# Patient Record
Sex: MALE | Race: WHITE | HISPANIC OR LATINO | URBAN - METROPOLITAN AREA
[De-identification: names, ages, dates, MRNs, and addresses within clinical notes are randomized per-mention and may not be internally consistent; named-entity substitution may affect disease eponyms.]

---

## 2017-04-14 ENCOUNTER — OUTPATIENT (OUTPATIENT)
Dept: OUTPATIENT SERVICES | Facility: HOSPITAL | Age: 57
LOS: 1 days | End: 2017-04-14
Payer: COMMERCIAL

## 2017-04-14 DIAGNOSIS — A42.0: ICD-10-CM

## 2017-04-14 PROCEDURE — 77001 FLUOROGUIDE FOR VEIN DEVICE: CPT

## 2017-04-14 PROCEDURE — 36558 INSERT TUNNELED CV CATH: CPT

## 2017-04-14 PROCEDURE — 76937 US GUIDE VASCULAR ACCESS: CPT | Mod: 26

## 2017-04-14 PROCEDURE — 77001 FLUOROGUIDE FOR VEIN DEVICE: CPT | Mod: 26

## 2017-04-14 PROCEDURE — C1751: CPT

## 2017-04-14 PROCEDURE — 76937 US GUIDE VASCULAR ACCESS: CPT

## 2017-06-02 ENCOUNTER — OUTPATIENT (OUTPATIENT)
Dept: OUTPATIENT SERVICES | Facility: HOSPITAL | Age: 57
LOS: 1 days | End: 2017-06-02
Payer: COMMERCIAL

## 2017-06-02 DIAGNOSIS — A42.0: ICD-10-CM

## 2017-06-02 PROCEDURE — 36589 REMOVAL TUNNELED CV CATH: CPT

## 2017-06-02 PROCEDURE — 77001 FLUOROGUIDE FOR VEIN DEVICE: CPT | Mod: 26

## 2017-06-02 PROCEDURE — 76000 FLUOROSCOPY <1 HR PHYS/QHP: CPT

## 2023-06-15 ENCOUNTER — INPATIENT (INPATIENT)
Facility: HOSPITAL | Age: 63
LOS: 2 days | Discharge: HOME | DRG: 808 | End: 2023-06-18
Attending: STUDENT IN AN ORGANIZED HEALTH CARE EDUCATION/TRAINING PROGRAM | Admitting: INTERNAL MEDICINE
Payer: COMMERCIAL

## 2023-06-15 VITALS
HEIGHT: 69 IN | SYSTOLIC BLOOD PRESSURE: 105 MMHG | OXYGEN SATURATION: 100 % | DIASTOLIC BLOOD PRESSURE: 62 MMHG | WEIGHT: 160.06 LBS | TEMPERATURE: 98 F | HEART RATE: 96 BPM | RESPIRATION RATE: 18 BRPM

## 2023-06-15 DIAGNOSIS — D61.818 OTHER PANCYTOPENIA: ICD-10-CM

## 2023-06-15 LAB
ABO RH CONFIRMATION: SIGNIFICANT CHANGE UP
ALBUMIN SERPL ELPH-MCNC: 2 G/DL — LOW (ref 3.3–5)
ALP SERPL-CCNC: 291 U/L — HIGH (ref 40–120)
ALT FLD-CCNC: 217 U/L — HIGH (ref 12–78)
ANION GAP SERPL CALC-SCNC: 10 MMOL/L — SIGNIFICANT CHANGE UP (ref 5–17)
ANISOCYTOSIS BLD QL: SLIGHT — SIGNIFICANT CHANGE UP
APPEARANCE UR: CLEAR — SIGNIFICANT CHANGE UP
APTT BLD: 29.7 SEC — SIGNIFICANT CHANGE UP (ref 27.5–35.5)
AST SERPL-CCNC: 52 U/L — HIGH (ref 15–37)
BASOPHILS # BLD AUTO: 0 K/UL — SIGNIFICANT CHANGE UP (ref 0–0.2)
BASOPHILS NFR BLD AUTO: 0 % — SIGNIFICANT CHANGE UP (ref 0–2)
BILIRUB SERPL-MCNC: 1.2 MG/DL — SIGNIFICANT CHANGE UP (ref 0.2–1.2)
BILIRUB UR-MCNC: NEGATIVE — SIGNIFICANT CHANGE UP
BLD GP AB SCN SERPL QL: SIGNIFICANT CHANGE UP
BUN SERPL-MCNC: 30 MG/DL — HIGH (ref 7–23)
CALCIUM SERPL-MCNC: 8.4 MG/DL — LOW (ref 8.5–10.1)
CHLORIDE SERPL-SCNC: 107 MMOL/L — SIGNIFICANT CHANGE UP (ref 96–108)
CO2 SERPL-SCNC: 18 MMOL/L — LOW (ref 22–31)
COLOR SPEC: YELLOW — SIGNIFICANT CHANGE UP
CREAT SERPL-MCNC: 1.61 MG/DL — HIGH (ref 0.5–1.3)
DIFF PNL FLD: NEGATIVE — SIGNIFICANT CHANGE UP
EGFR: 47 ML/MIN/1.73M2 — LOW
EOSINOPHIL # BLD AUTO: 0.02 K/UL — SIGNIFICANT CHANGE UP (ref 0–0.5)
EOSINOPHIL NFR BLD AUTO: 1 % — SIGNIFICANT CHANGE UP (ref 0–6)
GLUCOSE SERPL-MCNC: 124 MG/DL — HIGH (ref 70–99)
GLUCOSE UR QL: NEGATIVE — SIGNIFICANT CHANGE UP
HCT VFR BLD CALC: 17.1 % — CRITICAL LOW (ref 39–50)
HGB BLD-MCNC: 5.5 G/DL — CRITICAL LOW (ref 13–17)
INR BLD: 1.36 RATIO — HIGH (ref 0.88–1.16)
KETONES UR-MCNC: NEGATIVE — SIGNIFICANT CHANGE UP
LACTATE SERPL-SCNC: 1.8 MMOL/L — SIGNIFICANT CHANGE UP (ref 0.7–2)
LACTATE SERPL-SCNC: 3.5 MMOL/L — HIGH (ref 0.7–2)
LEUKOCYTE ESTERASE UR-ACNC: NEGATIVE — SIGNIFICANT CHANGE UP
LYMPHOCYTES # BLD AUTO: 0.28 K/UL — LOW (ref 1–3.3)
LYMPHOCYTES # BLD AUTO: 15 % — SIGNIFICANT CHANGE UP (ref 13–44)
MANUAL SMEAR VERIFICATION: SIGNIFICANT CHANGE UP
MCHC RBC-ENTMCNC: 29.4 PG — SIGNIFICANT CHANGE UP (ref 27–34)
MCHC RBC-ENTMCNC: 32.2 GM/DL — SIGNIFICANT CHANGE UP (ref 32–36)
MCV RBC AUTO: 91.4 FL — SIGNIFICANT CHANGE UP (ref 80–100)
MONOCYTES # BLD AUTO: 0.04 K/UL — SIGNIFICANT CHANGE UP (ref 0–0.9)
MONOCYTES NFR BLD AUTO: 2 % — SIGNIFICANT CHANGE UP (ref 2–14)
NEUTROPHILS # BLD AUTO: 1.53 K/UL — LOW (ref 1.8–7.4)
NEUTROPHILS NFR BLD AUTO: 79 % — HIGH (ref 43–77)
NEUTS BAND # BLD: 3 % — SIGNIFICANT CHANGE UP (ref 0–8)
NITRITE UR-MCNC: NEGATIVE — SIGNIFICANT CHANGE UP
NRBC # BLD: 0 /100 — SIGNIFICANT CHANGE UP (ref 0–0)
NRBC # BLD: SIGNIFICANT CHANGE UP /100 WBCS (ref 0–0)
PH UR: 5 — SIGNIFICANT CHANGE UP (ref 5–8)
PLAT MORPH BLD: NORMAL — SIGNIFICANT CHANGE UP
PLATELET # BLD AUTO: 57 K/UL — LOW (ref 150–400)
PLATELET COUNT - ESTIMATE: ABNORMAL
POTASSIUM SERPL-MCNC: 4.5 MMOL/L — SIGNIFICANT CHANGE UP (ref 3.5–5.3)
POTASSIUM SERPL-SCNC: 4.5 MMOL/L — SIGNIFICANT CHANGE UP (ref 3.5–5.3)
PROT SERPL-MCNC: 6.8 GM/DL — SIGNIFICANT CHANGE UP (ref 6–8.3)
PROT UR-MCNC: NEGATIVE — SIGNIFICANT CHANGE UP
PROTHROM AB SERPL-ACNC: 15.8 SEC — HIGH (ref 10.5–13.4)
RBC # BLD: 1.87 M/UL — LOW (ref 4.2–5.8)
RBC # FLD: 15.2 % — HIGH (ref 10.3–14.5)
RBC BLD AUTO: ABNORMAL
SODIUM SERPL-SCNC: 135 MMOL/L — SIGNIFICANT CHANGE UP (ref 135–145)
SP GR SPEC: 1.01 — SIGNIFICANT CHANGE UP (ref 1.01–1.02)
UROBILINOGEN FLD QL: 1
WBC # BLD: 1.87 K/UL — LOW (ref 3.8–10.5)
WBC # FLD AUTO: 1.87 K/UL — LOW (ref 3.8–10.5)

## 2023-06-15 PROCEDURE — 85027 COMPLETE CBC AUTOMATED: CPT

## 2023-06-15 PROCEDURE — 86860 RBC ANTIBODY ELUTION: CPT

## 2023-06-15 PROCEDURE — 86905 BLOOD TYPING RBC ANTIGENS: CPT

## 2023-06-15 PROCEDURE — 86880 COOMBS TEST DIRECT: CPT

## 2023-06-15 PROCEDURE — 93010 ELECTROCARDIOGRAM REPORT: CPT

## 2023-06-15 PROCEDURE — 80048 BASIC METABOLIC PNL TOTAL CA: CPT

## 2023-06-15 PROCEDURE — 36430 TRANSFUSION BLD/BLD COMPNT: CPT

## 2023-06-15 PROCEDURE — 97163 PT EVAL HIGH COMPLEX 45 MIN: CPT | Mod: GP

## 2023-06-15 PROCEDURE — 80053 COMPREHEN METABOLIC PANEL: CPT

## 2023-06-15 PROCEDURE — 0225U NFCT DS DNA&RNA 21 SARSCOV2: CPT

## 2023-06-15 PROCEDURE — 80074 ACUTE HEPATITIS PANEL: CPT

## 2023-06-15 PROCEDURE — 80061 LIPID PANEL: CPT

## 2023-06-15 PROCEDURE — 71045 X-RAY EXAM CHEST 1 VIEW: CPT | Mod: 26

## 2023-06-15 PROCEDURE — P9016: CPT

## 2023-06-15 PROCEDURE — 86920 COMPATIBILITY TEST SPIN: CPT

## 2023-06-15 PROCEDURE — 86900 BLOOD TYPING SEROLOGIC ABO: CPT

## 2023-06-15 PROCEDURE — 86077 PHYS BLOOD BANK SERV XMATCH: CPT

## 2023-06-15 PROCEDURE — 0001U RBC DNA HEA 35 AG 11 BLD GRP: CPT

## 2023-06-15 PROCEDURE — 83605 ASSAY OF LACTIC ACID: CPT

## 2023-06-15 PROCEDURE — 81003 URINALYSIS AUTO W/O SCOPE: CPT

## 2023-06-15 PROCEDURE — 86870 RBC ANTIBODY IDENTIFICATION: CPT

## 2023-06-15 PROCEDURE — 97116 GAIT TRAINING THERAPY: CPT | Mod: GP

## 2023-06-15 PROCEDURE — 86921 COMPATIBILITY TEST INCUBATE: CPT

## 2023-06-15 PROCEDURE — 86850 RBC ANTIBODY SCREEN: CPT

## 2023-06-15 PROCEDURE — 86901 BLOOD TYPING SEROLOGIC RH(D): CPT

## 2023-06-15 PROCEDURE — 87086 URINE CULTURE/COLONY COUNT: CPT

## 2023-06-15 PROCEDURE — 85025 COMPLETE CBC W/AUTO DIFF WBC: CPT

## 2023-06-15 PROCEDURE — 36415 COLL VENOUS BLD VENIPUNCTURE: CPT

## 2023-06-15 PROCEDURE — 86922 COMPATIBILITY TEST ANTIGLOB: CPT

## 2023-06-15 PROCEDURE — 99291 CRITICAL CARE FIRST HOUR: CPT

## 2023-06-15 RX ORDER — ACETAMINOPHEN 500 MG
650 TABLET ORAL ONCE
Refills: 0 | Status: COMPLETED | OUTPATIENT
Start: 2023-06-15 | End: 2023-06-15

## 2023-06-15 RX ORDER — CEFEPIME 1 G/1
2000 INJECTION, POWDER, FOR SOLUTION INTRAMUSCULAR; INTRAVENOUS ONCE
Refills: 0 | Status: COMPLETED | OUTPATIENT
Start: 2023-06-15 | End: 2023-06-15

## 2023-06-15 RX ORDER — HYDROXYZINE HCL 10 MG
10 TABLET ORAL ONCE
Refills: 0 | Status: DISCONTINUED | OUTPATIENT
Start: 2023-06-15 | End: 2023-06-16

## 2023-06-15 RX ORDER — ONDANSETRON 8 MG/1
4 TABLET, FILM COATED ORAL ONCE
Refills: 0 | Status: COMPLETED | OUTPATIENT
Start: 2023-06-15 | End: 2023-06-15

## 2023-06-15 RX ORDER — SODIUM CHLORIDE 9 MG/ML
2000 INJECTION INTRAMUSCULAR; INTRAVENOUS; SUBCUTANEOUS ONCE
Refills: 0 | Status: COMPLETED | OUTPATIENT
Start: 2023-06-15 | End: 2023-06-15

## 2023-06-15 RX ORDER — VANCOMYCIN HCL 1 G
1000 VIAL (EA) INTRAVENOUS ONCE
Refills: 0 | Status: COMPLETED | OUTPATIENT
Start: 2023-06-15 | End: 2023-06-15

## 2023-06-15 RX ADMIN — SODIUM CHLORIDE 2000 MILLILITER(S): 9 INJECTION INTRAMUSCULAR; INTRAVENOUS; SUBCUTANEOUS at 18:15

## 2023-06-15 RX ADMIN — Medication 250 MILLIGRAM(S): at 18:47

## 2023-06-15 RX ADMIN — CEFEPIME 100 MILLIGRAM(S): 1 INJECTION, POWDER, FOR SOLUTION INTRAMUSCULAR; INTRAVENOUS at 18:15

## 2023-06-15 RX ADMIN — ONDANSETRON 4 MILLIGRAM(S): 8 TABLET, FILM COATED ORAL at 18:16

## 2023-06-15 RX ADMIN — Medication 650 MILLIGRAM(S): at 18:47

## 2023-06-15 NOTE — ED PROVIDER NOTE - OBJECTIVE STATEMENT
64 y/o male w/PMHx of multiple myeloma, CVA, HLD presents to ED from [facility] regarding abnormal labs. pt had outpatient labs and found to have a hemoglobin of 6.1. pt was supposed to get transfusion tomorrow at outpatient but is unable to wait due to low levels of hgb, so MD sent pt to ED for transfusion to be done today. pt endorses generalized weakness, dizziness, and chills. adds that today was his second treatment for multiple myeloma and that they get it done once a week. denies bloody/black stool. pt on ASA. Onco: Dr. Farrell 64 y/o male w/PMHx of multiple myeloma, CVA, HLD presents to ED from [facility] regarding abnormal labs. pt had outpatient labs and found to have a hemoglobin of 6.1. pt was supposed to get transfusion tomorrow at outpatient but is unable to wait due to low levels of hgb, so MD sent pt to ED for transfusion to be done today. pt endorses generalized weakness, dizziness, and chills. adds that today was his second treatment for multiple myeloma and that they get it done once a week. denies rectal bleeding or melena; pt on ASA. Onco: Dr. Farrell

## 2023-06-15 NOTE — ED ADULT NURSE NOTE - CHIEF COMPLAINT QUOTE
Pt presents to the ED from [facility] for a Hgb of 6.1. Pt reports feeling dizzy today at doctors appointment. Denies CP, SOB, or fatigue. PMH of multiple myeloma, CVA, and HLD. Pt sent for EKG.

## 2023-06-15 NOTE — ED ADULT NURSE NOTE - OBJECTIVE STATEMENT
Patient presents to the ER with complaints of low hemoglobin from [facility]. Patient alert and oriented x3, complaining of feeling dizzy and chills. Patient reports chronic back pain and denies chest pain, shortness of breath, N/V/D, bleeding.

## 2023-06-15 NOTE — ED STATDOCS - NSICDXPASTMEDICALHX_GEN_ALL_CORE_FT
PAST MEDICAL HISTORY:  CVA (cerebrovascular accident)     HLD (hyperlipidemia)     Multiple myeloma

## 2023-06-15 NOTE — ED STATDOCS - NS_ ATTENDINGSCRIBEDETAILS _ED_A_ED_FT
I, Luis Krishnamurthy MD, performed the initial face to face bedside interview with this patient regarding history of present illness and determined that the patient should be seen in the main ED.  The history, was documented by the scribe in my presence and I attest to the accuracy of the documentation.

## 2023-06-15 NOTE — ED PROVIDER NOTE - PROGRESS NOTE DETAILS
Ekaterina Brady for attending Dr. Renteria: temp 100.3F. Stool guaiac performed by Dr. Renteria. Lot #[id number]. Expires: 10/31/2023; Result: brown stool, guaiac negative. Ekaterina Brady for attending Dr. Renteria: temp 100.3F- sepsis protocol initiated as patient is immunocompromised and currently undergoing tx; Stool guaiac performed by Dr. Renteria. Lot #[id number]. Expires: 10/31/2023; Result: brown stool, guaiac negative.

## 2023-06-15 NOTE — ED STATDOCS - PROGRESS NOTE DETAILS
Ekaterina Moser for attending Dr. Krishnamurthy: 62 y/o male with a PMHx of CVA, HLD, multiple myeloma presents to the ED regarding abnormal labs. Pt had outpatient labs and found to have a hemoglobin of 6.1. Pt symptomatic today with fatigue. Will send pt to main ED for further evaluation. Ekaterina Moser for attending Dr. Krishnamurthy: 64 y/o male with a PMHx of CVA, HLD, multiple myeloma presents to the ED regarding abnormal labs. Pt had outpatient labs and found to have a hemoglobin of 6.1. Pt symptomatic today with fatigue. Has never had a blood transfusion before. Will send pt to main ED for further evaluation.

## 2023-06-15 NOTE — ED ADULT NURSE REASSESSMENT NOTE - NS ED NURSE REASSESS COMMENT FT1
pt RVP sent, urine sent, called lab to see if PRBC were ready, informed they were not ready yet.  will either start blood if ready downstairs or once pt is transported to unit.  vss, resting in bed, updated on plan of care, callbell within reach.

## 2023-06-15 NOTE — PATIENT PROFILE ADULT - FALL HARM RISK - HARM RISK INTERVENTIONS
Assistance with ambulation/Assistance OOB with selected safe patient handling equipment/Communicate Risk of Fall with Harm to all staff/Discuss with provider need for PT consult/Monitor gait and stability/Reinforce activity limits and safety measures with patient and family/Tailored Fall Risk Interventions/Visual Cue: Yellow wristband and red socks/Bed in lowest position, wheels locked, appropriate side rails in place/Call bell, personal items and telephone in reach/Instruct patient to call for assistance before getting out of bed or chair/Non-slip footwear when patient is out of bed/[place] to call system/Physically safe environment - no spills, clutter or unnecessary equipment/Purposeful Proactive Rounding/Room/bathroom lighting operational, light cord in reach

## 2023-06-15 NOTE — ED ADULT NURSE NOTE - NSFALLHARMRISKINTERV_ED_ALL_ED

## 2023-06-15 NOTE — ED PROVIDER NOTE - CLINICAL SUMMARY MEDICAL DECISION MAKING FREE TEXT BOX
64 y/o M with history of multiple myeloma, anemia, generalized weakness. screening EKG, CXR, labs, and reevaluate. 62 y/o M with history of multiple myeloma, anemia, generalized weakness. screening EKG, CXR, labs, and reevaluate.    Dexter DO: 8:06PM: Will transfuse 2 units prbcs; endorsed to Dr. Shrestha at this time. Dr. Shrestha requests remote tele bed.

## 2023-06-16 DIAGNOSIS — Z96.641 PRESENCE OF RIGHT ARTIFICIAL HIP JOINT: Chronic | ICD-10-CM

## 2023-06-16 LAB
ALLERGY+IMMUNOLOGY DIAG STUDY NOTE: SIGNIFICANT CHANGE UP
ANION GAP SERPL CALC-SCNC: 9 MMOL/L — SIGNIFICANT CHANGE UP (ref 5–17)
ANISOCYTOSIS BLD QL: SLIGHT — SIGNIFICANT CHANGE UP
BASOPHILS # BLD AUTO: 0.01 K/UL — SIGNIFICANT CHANGE UP (ref 0–0.2)
BASOPHILS NFR BLD AUTO: 0.4 % — SIGNIFICANT CHANGE UP (ref 0–2)
BLD GP AB SCN SERPL QL: SIGNIFICANT CHANGE UP
BUN SERPL-MCNC: 20 MG/DL — SIGNIFICANT CHANGE UP (ref 7–23)
CALCIUM SERPL-MCNC: 7.4 MG/DL — LOW (ref 8.5–10.1)
CHLORIDE SERPL-SCNC: 113 MMOL/L — HIGH (ref 96–108)
CO2 SERPL-SCNC: 17 MMOL/L — LOW (ref 22–31)
CREAT SERPL-MCNC: 1.07 MG/DL — SIGNIFICANT CHANGE UP (ref 0.5–1.3)
DIR ANTIGLOB POLYSPECIFIC INTERPRETATION: SIGNIFICANT CHANGE UP
DIR ANTIGLOB POLYSPECIFIC INTERPRETATION: SIGNIFICANT CHANGE UP
EGFR: 77 ML/MIN/1.73M2 — SIGNIFICANT CHANGE UP
EOSINOPHIL # BLD AUTO: 0.1 K/UL — SIGNIFICANT CHANGE UP (ref 0–0.5)
EOSINOPHIL NFR BLD AUTO: 3.7 % — SIGNIFICANT CHANGE UP (ref 0–6)
GLUCOSE SERPL-MCNC: 110 MG/DL — HIGH (ref 70–99)
HCT VFR BLD CALC: 20.9 % — CRITICAL LOW (ref 39–50)
HGB BLD-MCNC: 6.9 G/DL — CRITICAL LOW (ref 13–17)
IMM GRANULOCYTES NFR BLD AUTO: 1.1 % — HIGH (ref 0–0.9)
LYMPHOCYTES # BLD AUTO: 0.2 K/UL — LOW (ref 1–3.3)
LYMPHOCYTES # BLD AUTO: 7.4 % — LOW (ref 13–44)
MANUAL SMEAR VERIFICATION: SIGNIFICANT CHANGE UP
MCHC RBC-ENTMCNC: 29.4 PG — SIGNIFICANT CHANGE UP (ref 27–34)
MCHC RBC-ENTMCNC: 33 GM/DL — SIGNIFICANT CHANGE UP (ref 32–36)
MCV RBC AUTO: 88.9 FL — SIGNIFICANT CHANGE UP (ref 80–100)
MONOCYTES # BLD AUTO: 0.15 K/UL — SIGNIFICANT CHANGE UP (ref 0–0.9)
MONOCYTES NFR BLD AUTO: 5.6 % — SIGNIFICANT CHANGE UP (ref 2–14)
NEUTROPHILS # BLD AUTO: 2.21 K/UL — SIGNIFICANT CHANGE UP (ref 1.8–7.4)
NEUTROPHILS NFR BLD AUTO: 81.8 % — HIGH (ref 43–77)
PLAT MORPH BLD: NORMAL — SIGNIFICANT CHANGE UP
PLATELET # BLD AUTO: 41 K/UL — LOW (ref 150–400)
PLATELET COUNT - ESTIMATE: ABNORMAL
POTASSIUM SERPL-MCNC: 3.7 MMOL/L — SIGNIFICANT CHANGE UP (ref 3.5–5.3)
POTASSIUM SERPL-SCNC: 3.7 MMOL/L — SIGNIFICANT CHANGE UP (ref 3.5–5.3)
RAPID RVP RESULT: SIGNIFICANT CHANGE UP
RBC # BLD: 2.35 M/UL — LOW (ref 4.2–5.8)
RBC # FLD: 14.6 % — HIGH (ref 10.3–14.5)
RBC BLD AUTO: ABNORMAL
SARS-COV-2 RNA SPEC QL NAA+PROBE: SIGNIFICANT CHANGE UP
SODIUM SERPL-SCNC: 139 MMOL/L — SIGNIFICANT CHANGE UP (ref 135–145)
WBC # BLD: 2.7 K/UL — LOW (ref 3.8–10.5)
WBC # FLD AUTO: 2.7 K/UL — LOW (ref 3.8–10.5)

## 2023-06-16 PROCEDURE — 99223 1ST HOSP IP/OBS HIGH 75: CPT

## 2023-06-16 PROCEDURE — 99497 ADVNCD CARE PLAN 30 MIN: CPT | Mod: 25

## 2023-06-16 RX ORDER — VANCOMYCIN HCL 1 G
1000 VIAL (EA) INTRAVENOUS EVERY 24 HOURS
Refills: 0 | Status: DISCONTINUED | OUTPATIENT
Start: 2023-06-16 | End: 2023-06-18

## 2023-06-16 RX ORDER — DOCUSATE SODIUM 100 MG
1 CAPSULE ORAL
Refills: 0 | DISCHARGE

## 2023-06-16 RX ORDER — POLYETHYLENE GLYCOL 3350 17 G/17G
17 POWDER, FOR SOLUTION ORAL DAILY
Refills: 0 | Status: DISCONTINUED | OUTPATIENT
Start: 2023-06-16 | End: 2023-06-18

## 2023-06-16 RX ORDER — ACYCLOVIR SODIUM 500 MG
1 VIAL (EA) INTRAVENOUS
Refills: 0 | DISCHARGE

## 2023-06-16 RX ORDER — ASPIRIN/CALCIUM CARB/MAGNESIUM 324 MG
1 TABLET ORAL
Refills: 0 | DISCHARGE

## 2023-06-16 RX ORDER — ONDANSETRON 8 MG/1
4 TABLET, FILM COATED ORAL EVERY 8 HOURS
Refills: 0 | Status: DISCONTINUED | OUTPATIENT
Start: 2023-06-16 | End: 2023-06-18

## 2023-06-16 RX ORDER — BACLOFEN 100 %
10 POWDER (GRAM) MISCELLANEOUS EVERY 8 HOURS
Refills: 0 | Status: DISCONTINUED | OUTPATIENT
Start: 2023-06-16 | End: 2023-06-18

## 2023-06-16 RX ORDER — ONDANSETRON 8 MG/1
1 TABLET, FILM COATED ORAL
Refills: 0 | DISCHARGE

## 2023-06-16 RX ORDER — SENNA PLUS 8.6 MG/1
2 TABLET ORAL
Refills: 0 | DISCHARGE

## 2023-06-16 RX ORDER — SERTRALINE 25 MG/1
100 TABLET, FILM COATED ORAL DAILY
Refills: 0 | Status: DISCONTINUED | OUTPATIENT
Start: 2023-06-16 | End: 2023-06-18

## 2023-06-16 RX ORDER — ASPIRIN/CALCIUM CARB/MAGNESIUM 324 MG
81 TABLET ORAL DAILY
Refills: 0 | Status: DISCONTINUED | OUTPATIENT
Start: 2023-06-16 | End: 2023-06-18

## 2023-06-16 RX ORDER — LIDOCAINE 4 G/100G
1 CREAM TOPICAL DAILY
Refills: 0 | Status: DISCONTINUED | OUTPATIENT
Start: 2023-06-16 | End: 2023-06-18

## 2023-06-16 RX ORDER — HYDROXYZINE HCL 10 MG
10 TABLET ORAL AT BEDTIME
Refills: 0 | Status: DISCONTINUED | OUTPATIENT
Start: 2023-06-16 | End: 2023-06-18

## 2023-06-16 RX ORDER — BACLOFEN 100 %
1 POWDER (GRAM) MISCELLANEOUS
Refills: 0 | DISCHARGE

## 2023-06-16 RX ORDER — HYDROXYZINE HCL 10 MG
10 TABLET ORAL DAILY
Refills: 0 | Status: DISCONTINUED | OUTPATIENT
Start: 2023-06-16 | End: 2023-06-18

## 2023-06-16 RX ORDER — DEXAMETHASONE 0.5 MG/5ML
1 ELIXIR ORAL
Refills: 0 | DISCHARGE

## 2023-06-16 RX ORDER — OXYCODONE HYDROCHLORIDE 5 MG/1
10 TABLET ORAL EVERY 4 HOURS
Refills: 0 | Status: DISCONTINUED | OUTPATIENT
Start: 2023-06-16 | End: 2023-06-18

## 2023-06-16 RX ORDER — PANTOPRAZOLE SODIUM 20 MG/1
40 TABLET, DELAYED RELEASE ORAL
Refills: 0 | Status: DISCONTINUED | OUTPATIENT
Start: 2023-06-16 | End: 2023-06-18

## 2023-06-16 RX ORDER — NALOXONE HYDROCHLORIDE 4 MG/.1ML
0.4 SPRAY NASAL ONCE
Refills: 0 | Status: DISCONTINUED | OUTPATIENT
Start: 2023-06-16 | End: 2023-06-18

## 2023-06-16 RX ORDER — SERTRALINE 25 MG/1
1 TABLET, FILM COATED ORAL
Refills: 0 | DISCHARGE

## 2023-06-16 RX ORDER — ACETAMINOPHEN 500 MG
2 TABLET ORAL
Refills: 0 | DISCHARGE

## 2023-06-16 RX ORDER — HYDROXYZINE HCL 10 MG
1 TABLET ORAL
Refills: 0 | DISCHARGE

## 2023-06-16 RX ORDER — OXYCODONE HYDROCHLORIDE 5 MG/1
1 TABLET ORAL
Refills: 0 | DISCHARGE

## 2023-06-16 RX ORDER — LANOLIN ALCOHOL/MO/W.PET/CERES
3 CREAM (GRAM) TOPICAL AT BEDTIME
Refills: 0 | Status: DISCONTINUED | OUTPATIENT
Start: 2023-06-16 | End: 2023-06-18

## 2023-06-16 RX ORDER — CEFEPIME 1 G/1
2000 INJECTION, POWDER, FOR SOLUTION INTRAMUSCULAR; INTRAVENOUS EVERY 12 HOURS
Refills: 0 | Status: DISCONTINUED | OUTPATIENT
Start: 2023-06-16 | End: 2023-06-18

## 2023-06-16 RX ORDER — SENNA PLUS 8.6 MG/1
2 TABLET ORAL AT BEDTIME
Refills: 0 | Status: DISCONTINUED | OUTPATIENT
Start: 2023-06-16 | End: 2023-06-18

## 2023-06-16 RX ORDER — LENALIDOMIDE 5 MG/1
1 CAPSULE ORAL
Refills: 0 | DISCHARGE

## 2023-06-16 RX ORDER — ACETAMINOPHEN 500 MG
650 TABLET ORAL EVERY 6 HOURS
Refills: 0 | Status: DISCONTINUED | OUTPATIENT
Start: 2023-06-16 | End: 2023-06-18

## 2023-06-16 RX ORDER — OXYCODONE HYDROCHLORIDE 5 MG/1
5 TABLET ORAL EVERY 4 HOURS
Refills: 0 | Status: DISCONTINUED | OUTPATIENT
Start: 2023-06-16 | End: 2023-06-18

## 2023-06-16 RX ORDER — ACYCLOVIR SODIUM 500 MG
400 VIAL (EA) INTRAVENOUS
Refills: 0 | Status: DISCONTINUED | OUTPATIENT
Start: 2023-06-16 | End: 2023-06-18

## 2023-06-16 RX ORDER — DEXAMETHASONE 0.5 MG/5ML
4 ELIXIR ORAL DAILY
Refills: 0 | Status: DISCONTINUED | OUTPATIENT
Start: 2023-06-16 | End: 2023-06-18

## 2023-06-16 RX ORDER — OMEPRAZOLE 10 MG/1
1 CAPSULE, DELAYED RELEASE ORAL
Refills: 0 | DISCHARGE

## 2023-06-16 RX ORDER — VANCOMYCIN HCL 1 G
500 VIAL (EA) INTRAVENOUS EVERY 12 HOURS
Refills: 0 | Status: DISCONTINUED | OUTPATIENT
Start: 2023-06-16 | End: 2023-06-16

## 2023-06-16 RX ORDER — ALPRAZOLAM 0.25 MG
0.25 TABLET ORAL
Refills: 0 | Status: DISCONTINUED | OUTPATIENT
Start: 2023-06-16 | End: 2023-06-18

## 2023-06-16 RX ADMIN — PANTOPRAZOLE SODIUM 40 MILLIGRAM(S): 20 TABLET, DELAYED RELEASE ORAL at 09:09

## 2023-06-16 RX ADMIN — Medication 81 MILLIGRAM(S): at 09:09

## 2023-06-16 RX ADMIN — Medication 400 MILLIGRAM(S): at 09:10

## 2023-06-16 RX ADMIN — Medication 250 MILLIGRAM(S): at 20:36

## 2023-06-16 RX ADMIN — Medication 4 MILLIGRAM(S): at 09:10

## 2023-06-16 RX ADMIN — SENNA PLUS 2 TABLET(S): 8.6 TABLET ORAL at 22:03

## 2023-06-16 RX ADMIN — CEFEPIME 100 MILLIGRAM(S): 1 INJECTION, POWDER, FOR SOLUTION INTRAMUSCULAR; INTRAVENOUS at 09:13

## 2023-06-16 RX ADMIN — CEFEPIME 100 MILLIGRAM(S): 1 INJECTION, POWDER, FOR SOLUTION INTRAMUSCULAR; INTRAVENOUS at 22:01

## 2023-06-16 RX ADMIN — Medication 10 MILLIGRAM(S): at 18:51

## 2023-06-16 RX ADMIN — POLYETHYLENE GLYCOL 3350 17 GRAM(S): 17 POWDER, FOR SOLUTION ORAL at 09:12

## 2023-06-16 RX ADMIN — Medication 10 MILLIGRAM(S): at 09:09

## 2023-06-16 RX ADMIN — Medication 0.25 MILLIGRAM(S): at 22:01

## 2023-06-16 RX ADMIN — LIDOCAINE 1 PATCH: 4 CREAM TOPICAL at 20:53

## 2023-06-16 RX ADMIN — ONDANSETRON 4 MILLIGRAM(S): 8 TABLET, FILM COATED ORAL at 20:36

## 2023-06-16 RX ADMIN — SERTRALINE 100 MILLIGRAM(S): 25 TABLET, FILM COATED ORAL at 09:10

## 2023-06-16 RX ADMIN — Medication 400 MILLIGRAM(S): at 22:02

## 2023-06-16 NOTE — H&P ADULT - NSICDXFAMILYHX_GEN_ALL_CORE_FT
FAMILY HISTORY:  Father  Still living? Unknown  FH: heart failure, Age at diagnosis: Age Unknown    Mother  Still living? Unknown  FH: dementia, Age at diagnosis: Age Unknown

## 2023-06-16 NOTE — H&P ADULT - NSHPSOCIALHISTORY_GEN_ALL_CORE
Lives with his wife. Independent with ADLs and IADLs. Smoked 1 ppd x 40 years and quit 7 years ago. Denies alcohol and drug use.

## 2023-06-16 NOTE — PHYSICAL THERAPY INITIAL EVALUATION ADULT - PERTINENT HX OF CURRENT PROBLEM, REHAB EVAL
The patient is a 63y Male complaining of abnormal lab result. PMHx of multiple myeloma, CVA, HLD presents to ED from [facility] regarding abnormal labs. pt had outpatient labs and found to have a hemoglobin of 6.1. pt was supposed to get transfusion tomorrow at outpatient but is unable to wait due to low levels of hgb, so MD sent pt to ED for transfusion to be done today. Hb 5.5 (baseline ~13). Pt. s/p 	Pancytopenia.

## 2023-06-16 NOTE — DIETITIAN INITIAL EVALUATION ADULT - PERTINENT LABORATORY DATA
06-15    135  |  107  |  30<H>  ----------------------------<  124<H>  4.5   |  18<L>  |  1.61<H>    Ca    8.4<L>      15 Se 2023 17:56    TPro  6.8  /  Alb  2.0<L>  /  TBili  1.2  /  DBili  x   /  AST  52<H>  /  ALT  217<H>  /  AlkPhos  291<H>  06-15

## 2023-06-16 NOTE — CONSULT NOTE ADULT - ASSESSMENT
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 Pt states that he [place] [facility] [place] and he was being observed afterwards. He became "wobbly" on his feet and fell down. He does not have any injuries, did not lose consciousness and did not hit his head. His blood was taken and his Hb was found to be 6 so he was transferred to the hospital for evaluation. States that once he got to the hospital he had fever and chills. He had abdominal pain, nausea, and vomiting last night which have since improved. His 2nd round of chemotherapy was on the day PTA. Denies chest pain, SOB, and diarrhea. In ER he was noted with low grade fever ro 100.3F and received cefepime and vancomycin IV.     1. Febrile syndrome. Neutropenia. Multiple myeloma. CRF stage 3. Immunocompromised host.   -obtain BC x 2, urine c/s  -start cefepime 2 gm IV q12h  -reason for abx use and side effects reviewed with patient; monitor BMP   -f/u cultures  -old chart reviewed to assess prior cultures  -neutropenic precautions  -monitor temps  -f/u CBC  -supportive care  2. Other issues:   -care per medicine

## 2023-06-16 NOTE — H&P ADULT - NSHPREVIEWOFSYSTEMS_GEN_ALL_CORE
Constitutional: positive for fatigue, negative for fever, negative for chills, negative for decreased appetite.   Skin: negative for rashes, negative for open wounds, negative for jaundice.   Eyes: negative for blurry vision, negative for double vision.   Ears, nose, throat: negative for ear pain, negative for nasal congestion, negative for sore throat, negative for lymph node swelling.   Cardiovascular: negative for chest pain, negative for palpitations, negative for lower extremity swelling.   Respiratory: negative for shortness of breath, negative for wheezing, negative for cough.   Gastrointestinal: negative for abdominal pain, negative for nausea, negative for vomiting, negative for diarrhea, negative for constipation, negative for blood in the stool, negative for black tarry stools.   Genitourinary: negative for burning on urination, negative for urinary urgency or frequency, negative for blood in the urine.   Endocrine: negative for cold intolerance, negative for heat intolerance, negative for increased thirst.   Hematologic: negative for easy bruising or bleeding.   Musculoskeletal: negative for muscle/joint pain, negative for decreased range of motion.   Neurological: negative for dizziness, negative for headaches, negative for loss of consciousness, negative for motor weakness, negative for sensory deficits.   Psychiatric: negative for depression, negative for anxiety.

## 2023-06-16 NOTE — H&P ADULT - HISTORY OF PRESENT ILLNESS
62 y/o M with PMH     ER course: HR , Temp 100.3F. Labs: WBC 1.87, ANC 1533 (no neutropenia), Hb 5.5 (baseline ~13), PLT 57, neutrophils 79%, INR 1.36, lactate 3.5 -> 1.8, CO2 18, Cr 1.61 (baseline ~0.7), albumin 2.0, alkaline phosphatase 291, AST 52, . UA: negative. COVID and RVP negative. CXR: no consolidation, no effusion, no pneumothorax (personally reviewed).     Pt was given Cefepime, Vancomycin, 2L of NS, tylenol, Zofran. He is being admitted to med/surg for further management.    64 y/o M with PMH multiple myeloma, CVA (8/2022) presented with weakness and unsteadiness on his feet. Pt states that he had chemotherapy at [facility] [place] and he was being observed afterwards. He became "wobbly" on his feet and fell down. He does not have any injuries, did not lose consciouness and did not hit his head. His blood was taken and his Hb was found to be 6 so he was transferred to the hospital for evaluation. States that once he got to the hospital he had fever and chills. He had abdominal pain, nausea, and vomiting last night which have since improved. No bowel movement since Tuesday. I spoke to his daughter who stated that the pt's LFTs have been elevated but have been decreasing. His 2nd round of chemotherapy was yesterday. Denies chest pain, SOB, and diarrhea.     ER course: HR , Temp 100.3F. Labs: WBC 1.87, ANC 1533 (no neutropenia), Hb 5.5 (baseline ~13), PLT 57, neutrophils 79%, INR 1.36, lactate 3.5 -> 1.8, CO2 18, Cr 1.61 (baseline ~0.7), albumin 2.0, alkaline phosphatase 291, AST 52, . UA: negative. COVID and RVP negative. CXR: no consolidation, no effusion, no pneumothorax (personally reviewed). Occult negative. EKG: NSR with HR 97 bpm, normal intervals, no ST segment changes, no T wave inversions (personally reviewed).     Pt was given Cefepime, Vancomycin, 2L of NS, 2 units of PRBCs, tylenol, Zofran. He is being admitted to med/surg for further management.  62 y/o M with PMH multiple myeloma, CVA (8/2022) presented with weakness and unsteadiness on his feet. Pt states that he had chemotherapy at [facility] in [place] and he was being observed afterwards. He became "wobbly" on his feet and fell down. He does not have any injuries, did not lose consciousness and did not hit his head. His blood was taken and his Hb was found to be 6 so he was transferred to the hospital for evaluation. States that once he got to the hospital he had fever and chills. He had abdominal pain, nausea, and vomiting last night which have since improved. No bowel movement since Tuesday. I spoke to his daughter who stated that the pt's LFTs have been elevated but have been decreasing. His 2nd round of chemotherapy was yesterday. Denies chest pain, SOB, and diarrhea.     ER course: HR , Temp 100.3F. Labs: WBC 1.87, ANC 1533 (no neutropenia), Hb 5.5 (baseline ~13), PLT 57, neutrophils 79%, INR 1.36, lactate 3.5 -> 1.8, CO2 18, Cr 1.61 (baseline ~0.7), albumin 2.0, alkaline phosphatase 291, AST 52, . UA: negative. COVID and RVP negative. CXR: no consolidation, no effusion, no pneumothorax (personally reviewed). Occult negative. EKG: NSR with HR 97 bpm, normal intervals, no ST segment changes, no T wave inversions (personally reviewed).     Pt was given Cefepime, Vancomycin, 2L of NS, 2 units of PRBCs, tylenol, Zofran. He is being admitted to med/surg for further management.

## 2023-06-16 NOTE — DIETITIAN NUTRITION RISK NOTIFICATION - ADDITIONAL COMMENTS/DIETITIAN RECOMMENDATIONS
1) Continue w/ regular diet as tolerated  2) Add LPS TID (Provides 100kcal, 15g protein)  3) Monitor bowel movements, if no BM for >3 days, consider implementing bowel regimen.  4) Encourage protein-rich foods, maximize food preferences  5) MVI w/ minerals daily to ensure 100% RDA met  6) Consider adding thiamine 100 mg daily 2/2 poor PO intake/ malnutrition  7) Meal encouragement; tray set-up to increase po intake  8) Monitor lytes/ min and replete prn.  9) Confirm goals of care regarding nutrition support  RD will continue to monitor PO intake, labs, hydration, and wt prn.

## 2023-06-16 NOTE — DIETITIAN INITIAL EVALUATION ADULT - PERTINENT MEDS FT
MEDICATIONS  (STANDING):  acyclovir   Oral Tab/Cap 400 milliGRAM(s) Oral two times a day  aspirin  chewable 81 milliGRAM(s) Oral daily  cefepime   IVPB 2000 milliGRAM(s) IV Intermittent every 12 hours  dexAMETHasone     Tablet 4 milliGRAM(s) Oral daily  hydrOXYzine hydrochloride Syrup 10 milliGRAM(s) Oral daily  lidocaine   4% Patch 1 Patch Transdermal daily  naloxone Injectable 0.4 milliGRAM(s) IV Push once  pantoprazole    Tablet 40 milliGRAM(s) Oral before breakfast  polyethylene glycol 3350 17 Gram(s) Oral daily  senna 2 Tablet(s) Oral at bedtime  sertraline 100 milliGRAM(s) Oral daily  vancomycin  IVPB 1000 milliGRAM(s) IV Intermittent every 24 hours    MEDICATIONS  (PRN):  acetaminophen     Tablet .. 650 milliGRAM(s) Oral every 6 hours PRN Temp greater or equal to 38C (100.4F), Mild Pain (1 - 3)  ALPRAZolam 0.25 milliGRAM(s) Oral two times a day PRN anxiety  aluminum hydroxide/magnesium hydroxide/simethicone Suspension 30 milliLiter(s) Oral every 4 hours PRN Dyspepsia  baclofen 10 milliGRAM(s) Oral every 8 hours PRN Muscle Spasm  bisacodyl 5 milliGRAM(s) Oral daily PRN Constipation  hydrOXYzine hydrochloride 10 milliGRAM(s) Oral at bedtime PRN Anxiety  melatonin 3 milliGRAM(s) Oral at bedtime PRN Insomnia  ondansetron Injectable 4 milliGRAM(s) IV Push every 8 hours PRN Nausea and/or Vomiting  oxyCODONE    IR 5 milliGRAM(s) Oral every 4 hours PRN Moderate Pain (4 - 6)  oxyCODONE    IR 10 milliGRAM(s) Oral every 4 hours PRN Severe Pain (7 - 10)

## 2023-06-16 NOTE — H&P ADULT - NSHPPHYSICALEXAM_GEN_ALL_CORE
ICU Vital Signs Last 24 Hrs  T(C): 36.7 (16 Jun 2023 10:05), Max: 37.9 (15 Se 2023 17:56)  T(F): 98 (16 Jun 2023 10:05), Max: 100.3 (15 Se 2023 17:56)  HR: 92 (16 Jun 2023 10:05) (82 - 106)  BP: 108/58 (16 Jun 2023 10:05) (94/63 - 108/58)  BP(mean): 78 (16 Jun 2023 07:07) (71 - 80)  RR: 18 (16 Jun 2023 10:05) (16 - 18)  SpO2: 99% (16 Jun 2023 10:05) (97% - 100%)    O2 Parameters below as of 16 Jun 2023 10:05  Patient On (Oxygen Delivery Method): room air    General: Awake and alert, cooperative with exam. No acute distress.   Skin: Warm, dry, and pink.   Eyes: Pupils equal and reactive to light. Extraocular eye movements intact. No conjunctival injection, discharge, or scleral icterus.   HEENT: Atraumatic, normocephalic. Moist mucus membranes.   Cardiology: Normal S1, S2. No murmurs, rubs, or gallops. Regular rate and rhythm.   Respiratory: Lungs clear to ascultation bilaterally. Good air exchange. No wheezes, rales, or rhonchi. Normal chest expansion.   Gastrointestinal: Positive bowel sounds. Soft, non-tender, non-distended. No guarding, rigidity, or rebound tenderness. No hepatosplenomegaly.   Musculoskeletal: 5/5 motor strength in all extremities. Normal range of motion.   Extremities: No peripheral edema bilaterally. Dorsalis pedis pulses 2+ bilaterally.   Neurological: A+Ox3 (person, place, and time). Cranial nerves 2-12 intact. Normal speech. No facial droop. No focal neurological deficits.   Psychiatric: Normal affect. Normal mood.

## 2023-06-16 NOTE — DIETITIAN INITIAL EVALUATION ADULT - NSFNSGIIOFT_GEN_A_CORE
I&O's Detail    15 Se 2023 07:01  -  16 Jun 2023 07:00  --------------------------------------------------------  IN:    PRBCs (Packed Red Blood Cells): 287 mL  Total IN: 287 mL    OUT:  Total OUT: 0 mL    Total NET: 287 mL      16 Jun 2023 07:01  -  16 Jun 2023 11:59  --------------------------------------------------------  IN:    PRBCs (Packed Red Blood Cells): 351 mL  Total IN: 351 mL    OUT:  Total OUT: 0 mL    Total NET: 351 mL

## 2023-06-16 NOTE — H&P ADULT - ASSESSMENT
64 y/o M presented with weakness     1. Weakness secondary to normocytic anemia in the setting of recent chemotherapy  - Admit to med/surg   - Hb 5.5, occult negative in the ER   - s/p 2 units of PRBCs, will repeat H+H this afternoon   - Heme/Onc consult - Dr. Fonseca     2. Neutropenic fever, SIRS positive unknown source   - HR , Temp 100.3F, WBC 1.87, lactate 3.5 -> 1.8 (eleveted HR and lactate likely secondary to volume depletion rather than infection)  - s/p Cefepime, Vancomycinin ER; will continue   - f/u UCx, BCx x 2; adjust abx based on sensitivities  - Trend WBC, monitor for temperatures   - Tylenol for temperatures PRN   - s/p 2L of NS and 2 units of PRBCs, monitor off additional IVF and encouraged PO hydration  - ID consult - Dr. Romo     3. Pancytopenia secondary to bone marrow suppression from chemotherapy   - WBC 1.87, ANC 1533 (no neutropenia), Hb 5.5 (baseline ~13), PLT 57, trend   - May need neupogen   - c/w Acyclovir for prophylaxis for opportunistic infections     4. Acute kidney injury   - Cr 1.61 (baseline ~0.7), monitor closely   - s/p 2L of NS and 2 units of PRBCs, monitor off additional IVF and encouraged PO hydration  - Avoid nephrotoxic medications   - Strict I+Os     5. Transaminitis, elevated alkaline phosphatase   - Alkaline phosphatase 291, AST 52, , trend   - Daughter states LFTs have been improving   - Abdominal exam WNL on exam   - If uptrending/acute abd pain/or worsening abd exam, will order CT abd/pelvis     6. Hypoalbuminemia   - Albumin 2.0   - Nutrition consult     7. History of multiple myeloma, CVA (8/2022)  - c/w home medications; verified with pt at the bedside     DVT ppx: Lovenox 40 mg subcutaneous daily   Code status: Full code   Emergency contact: Pat Morrow (daughter) [phone]     I spent a total of 80 minutes on the date of this encounter coordinating the patient's care. This includes reviewing prior documentation, results and imaging in addition to completing a full history and physical examination on the patient. Further tests, medications, and procedures have been ordered as indicated. Laboratory results and the plan of care were communicated to the patient and/or their family member. Supporting documentation was completed and added to the patient's chart.  64 y/o M presented with weakness     1. Weakness secondary to normocytic anemia in the setting of recent chemotherapy  - Admit to med/surg   - Hb 5.5, occult negative in the ER   - s/p 2 units of PRBCs, will repeat H+H this afternoon   - Heme/Onc consult - Dr. Fonseca     2. Neutropenic fever, SIRS positive unknown source   - HR , Temp 100.3F, WBC 1.87, lactate 3.5 -> 1.8 (elevated HR and lactate likely secondary to volume depletion rather than infection)  - s/p Cefepime, Vancomycinin ER; will continue   - f/u UCx, BCx x 2; adjust abx based on sensitivities  - Trend WBC, monitor for temperatures   - Tylenol for temperatures PRN   - s/p 2L of NS and 2 units of PRBCs, monitor off additional IVF and encouraged PO hydration  - ID consult - Dr. Romo     3. Pancytopenia secondary to bone marrow suppression from chemotherapy   - WBC 1.87, ANC 1533 (no neutropenia), Hb 5.5 (baseline ~13), PLT 57, trend   - May need neupogen   - c/w Acyclovir for prophylaxis for opportunistic infections     4. Acute kidney injury   - Cr 1.61 (baseline ~0.7), monitor closely   - s/p 2L of NS and 2 units of PRBCs, monitor off additional IVF and encouraged PO hydration  - Avoid nephrotoxic medications   - Strict I+Os     5. Transaminitis, elevated alkaline phosphatase   - Alkaline phosphatase 291, AST 52, , trend   - Daughter states LFTs have been improving   - Abdominal exam WNL on exam   - If uptrending/acute abd pain/or worsening abd exam, will order CT abd/pelvis   - Ordered lipid panel and hepatitis panel     6. Hypoalbuminemia   - Albumin 2.0   - Nutrition consult     7. History of multiple myeloma, CVA (8/2022)  - c/w home medications; verified with pt at the bedside   - c/w ASA for now, occult negative     DVT ppx: SCDs, encouraged ambulation   Code status: Full code   Emergency contact: Pat Morrow (daughter) [phone]     I spent a total of 80 minutes on the date of this encounter coordinating the patient's care. This includes reviewing prior documentation, results and imaging in addition to completing a full history and physical examination on the patient. Further tests, medications, and procedures have been ordered as indicated. Laboratory results and the plan of care were communicated to the patient and/or their family member. Supporting documentation was completed and added to the patient's chart.

## 2023-06-16 NOTE — CONSULT NOTE ADULT - SUBJECTIVE AND OBJECTIVE BOX
Called pt he did not request a refill for vit d. Explained it probably was the pharmacy requesting. Explained we need a repeat of vit d lab for pt. He stated he has appt on 6/12. Linked the order with orders already placed.   Patient is a 63y old  Male who presents with a chief complaint of Other pancytopenia    HPI:  62 y/o Male with h/o multiple myeloma on chemotherapy, old CVA (2022) was admitted on  for increased weakness and unsteadiness on his feet. [place] states that [place] [facility] [place] and he was being observed afterwards. He became "wobbly" on his feet and fell down. He does not have any injuries, did not lose consciousness and did not hit his head. His blood was taken and his Hb was found to be 6 so he was transferred to the hospital for evaluation. States that once he got to the hospital he had fever and chills. He had abdominal pain, nausea, and vomiting last night which have since improved. His 2nd round of chemotherapy was on the day PTA. Denies chest pain, SOB, and diarrhea. In ER he was noted with low grade fever ro 100.3F and received cefepime and vancomycin IV.     PMH: as above  PSH: as above  Meds: per reconciliation sheet, noted below  MEDICATIONS  (STANDING):  acyclovir   Oral Tab/Cap 400 milliGRAM(s) Oral two times a day  aspirin  chewable 81 milliGRAM(s) Oral daily  cefepime   IVPB 2000 milliGRAM(s) IV Intermittent every 12 hours  dexAMETHasone     Tablet 4 milliGRAM(s) Oral daily  hydrOXYzine hydrochloride Syrup 10 milliGRAM(s) Oral daily  lidocaine   4% Patch 1 Patch Transdermal daily  naloxone Injectable 0.4 milliGRAM(s) IV Push once  pantoprazole    Tablet 40 milliGRAM(s) Oral before breakfast  polyethylene glycol 3350 17 Gram(s) Oral daily  senna 2 Tablet(s) Oral at bedtime  sertraline 100 milliGRAM(s) Oral daily  vancomycin  IVPB 1000 milliGRAM(s) IV Intermittent every 24 hours    MEDICATIONS  (PRN):  acetaminophen     Tablet .. 650 milliGRAM(s) Oral every 6 hours PRN Temp greater or equal to 38C (100.4F), Mild Pain (1 - 3)  ALPRAZolam 0.25 milliGRAM(s) Oral two times a day PRN anxiety  aluminum hydroxide/magnesium hydroxide/simethicone Suspension 30 milliLiter(s) Oral every 4 hours PRN Dyspepsia  baclofen 10 milliGRAM(s) Oral every 8 hours PRN Muscle Spasm  bisacodyl 5 milliGRAM(s) Oral daily PRN Constipation  hydrOXYzine hydrochloride 10 milliGRAM(s) Oral at bedtime PRN Anxiety  melatonin 3 milliGRAM(s) Oral at bedtime PRN Insomnia  ondansetron Injectable 4 milliGRAM(s) IV Push every 8 hours PRN Nausea and/or Vomiting  oxyCODONE    IR 5 milliGRAM(s) Oral every 4 hours PRN Moderate Pain (4 - 6)  oxyCODONE    IR 10 milliGRAM(s) Oral every 4 hours PRN Severe Pain (7 - 10)    Allergies    No Known Allergies    Intolerances      Social: no smoking, no alcohol, no illegal drugs; no recent travel, no exposure to TB  FAMILY HISTORY:  FH: dementia (Mother)  FH: heart failure (Father)    ROS: the patient denies HA, no seizures, no dizziness, no sore throat, no nasal congestion, no blurry vision, no CP, no palpitations, no SOB, no cough, no abdominal pain, no diarrhea, no N/V, no dysuria, no leg pain, no claudication, no rash, no joint aches, no rectal pain or bleeding, no night sweats, has increased weakness  All other systems reviewed and are negative    Vital Signs Last 24 Hrs  T(C): 36.7 (2023 10:05), Max: 37.9 (15 Se 2023 17:56)  T(F): 98 (2023 10:05), Max: 100.3 (15 Se 2023 17:56)  HR: 92 (2023 10:05) (82 - 106)  BP: 108/58 (2023 10:05) (94/63 - 108/58)  BP(mean): 78 (2023 07:07) (71 - 80)  RR: 18 (2023 10:05) (16 - 18)  SpO2: 99% (2023 10:05) (97% - 100%)    Parameters below as of 2023 10:05  Patient On (Oxygen Delivery Method): room air      Daily Height in cm: 175.26 (15 Se 2023 16:19)    Daily     PE:    Constitutional:  No acute distress  HEENT: NC/AT, EOMI, PERRLA, conjunctivae clear; ears and nose atraumatic; pharynx benign  Neck: supple; thyroid not palpable  Back: no tenderness  Respiratory: respiratory effort normal; clear to auscultation  Cardiovascular: S1S2 regular, no murmurs  Abdomen: soft, not tender, not distended, positive BS; no liver or spleen organomegaly  Genitourinary: no suprapubic tenderness  Lymphatic: no LN palpable  Musculoskeletal: no muscle tenderness, no joint swelling or tenderness  Extremities: no pedal edema  Neurological/ Psychiatric: AxOx3, judgement and insight normal; moving all extremities  Skin: no rashes; no palpable lesions    Labs: all available labs reviewed                        5.5    1.87  )-----------( 57       ( 15 Se 2023 17:56 )             17.1     06-15    135  |  107  |  30<H>  ----------------------------<  124<H>  4.5   |  18<L>  |  1.61<H>    Ca    8.4<L>      15 Se 2023 17:56    TPro  6.8  /  Alb  2.0<L>  /  TBili  1.2  /  DBili  x   /  AST  52<H>  /  ALT  217<H>  /  AlkPhos  291<H>  06-15     LIVER FUNCTIONS - ( 15 Se 2023 17:56 )  Alb: 2.0 g/dL / Pro: 6.8 gm/dL / ALK PHOS: 291 U/L / ALT: 217 U/L / AST: 52 U/L / GGT: x           Urinalysis Basic - ( 15 Se 2023 21:57 )    Color: Yellow / Appearance: Clear / S.015 / pH: x  Gluc: x / Ketone: Negative  / Bili: Negative / Urobili: 1   Blood: x / Protein: Negative / Nitrite: Negative   Leuk Esterase: Negative / RBC: x / WBC x   Sq Epi: x / Non Sq Epi: x / Bacteria: x    (06-15 @ 21:57)  NotDete    Radiology: all available radiological tests reviewed        Advanced directives addressed: full resuscitation

## 2023-06-16 NOTE — DIETITIAN INITIAL EVALUATION ADULT - FACTORS AFF FOOD INTAKE
2/2 fatigue, acid reflux, lethargic, generalized weakness/change in sense of smell or taste/persistent lack of appetite/other (specify)

## 2023-06-16 NOTE — PHYSICAL THERAPY INITIAL EVALUATION ADULT - GENERAL OBSERVATIONS, REHAB EVAL
Pt. received standing in bathroom, Pt. agreeable to PT. Just received 2 units of blood this AM, amb in room Ind w/o AD Pt. received standing in bathroom, + tele Pt. agreeable to PT. Just received 2 units of blood this AM, amb in room Ind w/o AD

## 2023-06-16 NOTE — DIETITIAN INITIAL EVALUATION ADULT - ORAL INTAKE PTA/DIET HISTORY
Pt lives at home w/ wife; wife cooks/grocery shops w/o difficulty. Reports previously decreased appetite from March-April 2/2 no taste, lethargic, fatigue. Reports improved appetite, eating more & sense of taste has improved, but still only consuming 2 small meals/day (skips dinner daily). Likely consuming <50% of ENN x 3 mo 2/2 fatigue, generalized weakness, persistent acid reflux ; consumes naked fruit shakes and powerhouse protein shakes at home daily.

## 2023-06-16 NOTE — PHYSICAL THERAPY INITIAL EVALUATION ADULT - ADDITIONAL COMMENTS
Pt. lives with his wife and has 2 small steps to enter ranch style home. Pt. is Ind with ADL's amb w/o AD and currently is getting chemo- therapy ROM/muscle strength/gait, locomotion, and balance/joint integrity and mobility/posture/aerobic capacity/endurance

## 2023-06-17 LAB
ALBUMIN SERPL ELPH-MCNC: 1.7 G/DL — LOW (ref 3.3–5)
ALP SERPL-CCNC: 203 U/L — HIGH (ref 40–120)
ALT FLD-CCNC: 113 U/L — HIGH (ref 12–78)
ANION GAP SERPL CALC-SCNC: 7 MMOL/L — SIGNIFICANT CHANGE UP (ref 5–17)
AST SERPL-CCNC: 28 U/L — SIGNIFICANT CHANGE UP (ref 15–37)
BASOPHILS # BLD AUTO: 0 K/UL — SIGNIFICANT CHANGE UP (ref 0–0.2)
BASOPHILS NFR BLD AUTO: 0 % — SIGNIFICANT CHANGE UP (ref 0–2)
BILIRUB SERPL-MCNC: 0.8 MG/DL — SIGNIFICANT CHANGE UP (ref 0.2–1.2)
BUN SERPL-MCNC: 14 MG/DL — SIGNIFICANT CHANGE UP (ref 7–23)
CALCIUM SERPL-MCNC: 7.2 MG/DL — LOW (ref 8.5–10.1)
CHLORIDE SERPL-SCNC: 116 MMOL/L — HIGH (ref 96–108)
CHOLEST SERPL-MCNC: 111 MG/DL — SIGNIFICANT CHANGE UP
CO2 SERPL-SCNC: 20 MMOL/L — LOW (ref 22–31)
CREAT SERPL-MCNC: 0.89 MG/DL — SIGNIFICANT CHANGE UP (ref 0.5–1.3)
CULTURE RESULTS: SIGNIFICANT CHANGE UP
EGFR: 96 ML/MIN/1.73M2 — SIGNIFICANT CHANGE UP
EOSINOPHIL # BLD AUTO: 0.13 K/UL — SIGNIFICANT CHANGE UP (ref 0–0.5)
EOSINOPHIL NFR BLD AUTO: 6.3 % — HIGH (ref 0–6)
GLUCOSE SERPL-MCNC: 87 MG/DL — SIGNIFICANT CHANGE UP (ref 70–99)
HAV IGM SER-ACNC: SIGNIFICANT CHANGE UP
HBV CORE IGM SER-ACNC: SIGNIFICANT CHANGE UP
HBV SURFACE AG SER-ACNC: SIGNIFICANT CHANGE UP
HCT VFR BLD CALC: 21.8 % — LOW (ref 39–50)
HCV AB S/CO SERPL IA: 0.05 S/CO — SIGNIFICANT CHANGE UP (ref 0–0.99)
HCV AB SERPL-IMP: SIGNIFICANT CHANGE UP
HDLC SERPL-MCNC: 26 MG/DL — LOW
HGB BLD-MCNC: 7.4 G/DL — LOW (ref 13–17)
IMM GRANULOCYTES NFR BLD AUTO: 1 % — HIGH (ref 0–0.9)
LIPID PNL WITH DIRECT LDL SERPL: 58 MG/DL — SIGNIFICANT CHANGE UP
LYMPHOCYTES # BLD AUTO: 0.32 K/UL — LOW (ref 1–3.3)
LYMPHOCYTES # BLD AUTO: 15.5 % — SIGNIFICANT CHANGE UP (ref 13–44)
MCHC RBC-ENTMCNC: 29.2 PG — SIGNIFICANT CHANGE UP (ref 27–34)
MCHC RBC-ENTMCNC: 33.9 GM/DL — SIGNIFICANT CHANGE UP (ref 32–36)
MCV RBC AUTO: 86.2 FL — SIGNIFICANT CHANGE UP (ref 80–100)
MONOCYTES # BLD AUTO: 0.14 K/UL — SIGNIFICANT CHANGE UP (ref 0–0.9)
MONOCYTES NFR BLD AUTO: 6.8 % — SIGNIFICANT CHANGE UP (ref 2–14)
NEUTROPHILS # BLD AUTO: 1.45 K/UL — LOW (ref 1.8–7.4)
NEUTROPHILS NFR BLD AUTO: 70.4 % — SIGNIFICANT CHANGE UP (ref 43–77)
NON HDL CHOLESTEROL: 86 MG/DL — SIGNIFICANT CHANGE UP
PLATELET # BLD AUTO: 52 K/UL — LOW (ref 150–400)
POTASSIUM SERPL-MCNC: 3.6 MMOL/L — SIGNIFICANT CHANGE UP (ref 3.5–5.3)
POTASSIUM SERPL-SCNC: 3.6 MMOL/L — SIGNIFICANT CHANGE UP (ref 3.5–5.3)
PROT SERPL-MCNC: 5.5 GM/DL — LOW (ref 6–8.3)
RBC # BLD: 2.53 M/UL — LOW (ref 4.2–5.8)
RBC # FLD: 15.8 % — HIGH (ref 10.3–14.5)
SODIUM SERPL-SCNC: 143 MMOL/L — SIGNIFICANT CHANGE UP (ref 135–145)
SPECIMEN SOURCE: SIGNIFICANT CHANGE UP
TRIGL SERPL-MCNC: 140 MG/DL — SIGNIFICANT CHANGE UP
WBC # BLD: 2.06 K/UL — LOW (ref 3.8–10.5)
WBC # FLD AUTO: 2.06 K/UL — LOW (ref 3.8–10.5)

## 2023-06-17 PROCEDURE — 99233 SBSQ HOSP IP/OBS HIGH 50: CPT

## 2023-06-17 RX ADMIN — SENNA PLUS 2 TABLET(S): 8.6 TABLET ORAL at 22:09

## 2023-06-17 RX ADMIN — Medication 10 MILLIGRAM(S): at 09:28

## 2023-06-17 RX ADMIN — CEFEPIME 100 MILLIGRAM(S): 1 INJECTION, POWDER, FOR SOLUTION INTRAMUSCULAR; INTRAVENOUS at 22:10

## 2023-06-17 RX ADMIN — PANTOPRAZOLE SODIUM 40 MILLIGRAM(S): 20 TABLET, DELAYED RELEASE ORAL at 05:55

## 2023-06-17 RX ADMIN — Medication 3 MILLIGRAM(S): at 22:12

## 2023-06-17 RX ADMIN — CEFEPIME 100 MILLIGRAM(S): 1 INJECTION, POWDER, FOR SOLUTION INTRAMUSCULAR; INTRAVENOUS at 09:20

## 2023-06-17 RX ADMIN — SERTRALINE 100 MILLIGRAM(S): 25 TABLET, FILM COATED ORAL at 09:21

## 2023-06-17 RX ADMIN — Medication 10 MILLIGRAM(S): at 05:54

## 2023-06-17 RX ADMIN — Medication 4 MILLIGRAM(S): at 09:22

## 2023-06-17 RX ADMIN — Medication 250 MILLIGRAM(S): at 17:17

## 2023-06-17 RX ADMIN — LIDOCAINE 1 PATCH: 4 CREAM TOPICAL at 09:34

## 2023-06-17 RX ADMIN — POLYETHYLENE GLYCOL 3350 17 GRAM(S): 17 POWDER, FOR SOLUTION ORAL at 09:21

## 2023-06-17 RX ADMIN — Medication 400 MILLIGRAM(S): at 09:21

## 2023-06-17 RX ADMIN — Medication 81 MILLIGRAM(S): at 09:21

## 2023-06-17 RX ADMIN — Medication 30 MILLILITER(S): at 12:07

## 2023-06-17 RX ADMIN — Medication 400 MILLIGRAM(S): at 22:08

## 2023-06-17 RX ADMIN — Medication 0.25 MILLIGRAM(S): at 22:10

## 2023-06-17 RX ADMIN — Medication 10 MILLIGRAM(S): at 15:47

## 2023-06-17 RX ADMIN — Medication 5 MILLIGRAM(S): at 17:21

## 2023-06-17 NOTE — PROGRESS NOTE ADULT - NUTRITIONAL ASSESSMENT
This patient has been assessed with a concern for Malnutrition and has been determined to have a diagnosis/diagnoses of Severe protein-calorie malnutrition and Underweight (BMI < 19).    This patient is being managed with:   Diet DASH/TLC-  Sodium & Cholesterol Restricted  Entered: Jun 16 2023  8:19AM

## 2023-06-17 NOTE — PROGRESS NOTE ADULT - SUBJECTIVE AND OBJECTIVE BOX
Reason for Admission: Weakness  History of Present Illness:   62 y/o M with PMH multiple myeloma, CVA (2022) presented with weakness and unsteadiness on his feet. Pt states that he had chemotherapy at [facility] [place] and he was being observed afterwards. He became "wobbly" on his feet and fell down. He does not have any injuries, did not lose consciousness and did not hit his head. His blood was taken and his Hb was found to be 6 so he was transferred to the hospital for evaluation. States that once he got to the hospital he had fever and chills. He had abdominal pain, nausea, and vomiting last night which have since improved. No bowel movement since Tuesday. I spoke to his daughter who stated that the pt's LFTs have been elevated but have been decreasing. His 2nd round of chemotherapy was yesterday. Denies chest pain, SOB, and diarrhea.     ER course: HR , Temp 100.3F. Labs: WBC 1.87, ANC 1533 (no neutropenia), Hb 5.5 (baseline ~13), PLT 57, neutrophils 79%, INR 1.36, lactate 3.5 -> 1.8, CO2 18, Cr 1.61 (baseline ~0.7), albumin 2.0, alkaline phosphatase 291, AST 52, . UA: negative. COVID and RVP negative. CXR: no consolidation, no effusion, no pneumothorax (personally reviewed). Occult negative. EKG: NSR with HR 97 bpm, normal intervals, no ST segment changes, no T wave inversions (personally reviewed).       REVIEW OF SYSTEMS:  General: NAD, hemodynamically stable, (-)  fever, (-) chills, (-) weakness  HEENT:  Eyes:  No visual loss, blurred vision, double vision or yellow sclerae. Ears, Nose, Throat:  No hearing loss, sneezing, congestion, runny nose or sore throat.  SKIN:  No rash or itching.  CARDIOVASCULAR:  No chest pain, chest pressure or chest discomfort. No palpitations or edema.  RESPIRATORY:  No shortness of breath, cough or sputum.  GASTROINTESTINAL:  No anorexia, nausea, vomiting or diarrhea. No abdominal pain or blood.  NEUROLOGICAL:  No headache, dizziness, syncope, paralysis, ataxia, numbness or tingling in the extremities. No change in bowel or bladder control.  MUSCULOSKELETAL:  No muscle, back pain, joint pain or stiffness.  HEMATOLOGIC:  No anemia, bleeding or bruising.  LYMPHATICS:  No enlarged nodes. No history of splenectomy.  ENDOCRINOLOGIC:  No reports of sweating, cold or heat intolerance. No polyuria or polydipsia.  ALLERGIES:  No history of asthma, hives, eczema or rhinitis.    Physical Exam:   GENERAL APPEARANCE:  NAD, hemodynamically stable  T(C): 36.8 (23 @ 08:20), Max: 36.9 (23 @ 16:19)  HR: 88 (23 @ 08:20) (88 - 100)  BP: 102/62 (23 @ 08:20) (101/66 - 119/72)  RR: 18 (23 @ 08:20) (18 - 18)  SpO2: 98% (23 @ 08:20) (98% - 100%)  Wt(kg): --  HEENT:  Head is normocephalic    Skin:  Warm and dry without any rash   NECK:  Supple without lymphadenopathy.   HEART:  Regular rate and rhythm. normal S1 and S2, No M/R/G  LUNGS:  Good ins/exp effort, no W/R/R/C  ABDOMEN:  Soft, nontender, nondistended with good bowel sounds heard  EXTREMITIES:  Without cyanosis, clubbing or edema.   NEUROLOGICAL:  Gross nonfocal       CBC Full  -  ( 2023 07:25 )  WBC Count : 2.06 K/uL  RBC Count : 2.53 M/uL  Hemoglobin : 7.4 g/dL  Hematocrit : 21.8 %  Platelet Count - Automated : 52 K/uL  Mean Cell Volume : 86.2 fl  Mean Cell Hemoglobin : 29.2 pg  Mean Cell Hemoglobin Concentration : 33.9 gm/dL  Auto Neutrophil # : 1.45 K/uL  Auto Lymphocyte # : 0.32 K/uL  Auto Monocyte # : 0.14 K/uL  Auto Eosinophil # : 0.13 K/uL  Auto Basophil # : 0.00 K/uL  Auto Neutrophil % : 70.4 %  Auto Lymphocyte % : 15.5 %  Auto Monocyte % : 6.8 %  Auto Eosinophil % : 6.3 %  Auto Basophil % : 0.0 %    PT/INR - ( 15 Se 2023 17:56 )   PT: 15.8 sec;   INR: 1.36 ratio         PTT - ( 15 Se 2023 17:56 )  PTT:29.7 sec  Urinalysis Basic - ( 15 Es 2023 21:57 )    Color: Yellow / Appearance: Clear / S.015 / pH: x  Gluc: x / Ketone: Negative  / Bili: Negative / Urobili: 1   Blood: x / Protein: Negative / Nitrite: Negative   Leuk Esterase: Negative / RBC: x / WBC x   Sq Epi: x / Non Sq Epi: x / Bacteria: x          143  |  116<H>  |  14  ----------------------------<  87  3.6   |  20<L>  |  0.89    Ca    7.2<L>      2023 07:25    TPro  5.5<L>  /  Alb  1.7<L>  /  TBili  0.8  /  DBili  x   /  AST  28  /  ALT  113<H>  /  AlkPhos  203<H>      62 y/o M presented with weakness     # Weakness secondary to normocytic anemia in the setting of recent chemotherapy  - Hb 5.5, occult negative in the ER   - s/p 2 units of PRBCs, will repeat H+H this afternoon   - Heme/Onc consult - Dr. Fonseca     # Febrile syndrome improving. Neutropenia. Multiple myeloma. CRF stage 3. Immunocompromised host.   - leukopenia   - C x 2, urine c/s noted  - on cefepime 2 gm IV q12h # 2  - tolerating abx well so far; no side effects noted  - continue abx coverage   - neutropenic precautions  - monitor temps    # Pancytopenia secondary to bone marrow suppression from chemotherapy   - WBC 1.87, ANC 1533 (no neutropenia), Hb 5.5 (baseline ~13), PLT 57, trend   - May need neupogen   - c/w Acyclovir for prophylaxis for opportunistic infections     # Acute kidney injury   - Cr 1.61 (baseline ~0.7), monitor closely   - s/p 2L of NS and 2 units of PRBCs, monitor off additional IVF and encouraged PO hydration  - Avoid nephrotoxic medications   - Strict I+Os     # Transaminitis, elevated alkaline phosphatase   - Alkaline phosphatase 291, AST 52, , trend   - Daughter states LFTs have been improving   - Abdominal exam WNL on exam   - If uptrending/acute abd pain/or worsening abd exam, will order CT abd/pelvis   - Ordered lipid panel and hepatitis panel     # Hypoalbuminemia   - Albumin 2.0   - Nutrition consult     # History of multiple myeloma, CVA (2022)  - c/w home medications; verified with pt at the bedside   - c/w ASA for now, occult negative     # DVT ppx: SCDs, encouraged ambulation   Code status: Full code   Emergency contact: Pat Morrow (daughter) [phone]    Reason for Admission: Weakness    Patient is a 62 y/o M with PMH multiple myeloma, CVA (2022) presented with weakness and unsteadiness on his feet. Pt states that he had [place] [facility] [place] [place] and he was being observed afterwards. He became "wobbly" on his feet and fell down. He does not have any injuries, did not lose consciousness and did not hit his head. His blood was taken and his Hb was found to be 6 so he was transferred to the hospital for evaluation. States that once he got to the hospital he had fever and chills. He had abdominal pain, nausea, and vomiting last night which have since improved. No bowel movement since Tuesday. I spoke to his daughter who stated that the pt's LFTs have been elevated but have been decreasing. His 2nd round of chemotherapy was yesterday. Denies chest pain, SOB, and diarrhea.       Medical progress:   Complaints:  State of mind:     REVIEW OF SYSTEMS:  General: NAD, hemodynamically stable, (-)  fever, (-) chills, (-) weakness  HEENT:  Eyes:  No visual loss, blurred vision, double vision or yellow sclerae. Ears, Nose, Throat:  No hearing loss, sneezing, congestion, runny nose or sore throat.  SKIN:  No rash or itching.  CARDIOVASCULAR:  No chest pain, chest pressure or chest discomfort. No palpitations or edema.  RESPIRATORY:  No shortness of breath, cough or sputum.  GASTROINTESTINAL:  No anorexia, nausea, vomiting or diarrhea. No abdominal pain or blood.  NEUROLOGICAL:  No headache, dizziness, syncope, paralysis, ataxia, numbness or tingling in the extremities. No change in bowel or bladder control.  MUSCULOSKELETAL:  No muscle, back pain, joint pain or stiffness.  HEMATOLOGIC:  No anemia, bleeding or bruising.  LYMPHATICS:  No enlarged nodes. No history of splenectomy.  ENDOCRINOLOGIC:  No reports of sweating, cold or heat intolerance. No polyuria or polydipsia.  ALLERGIES:  No history of asthma, hives, eczema or rhinitis.    Physical Exam:   GENERAL APPEARANCE:  NAD, hemodynamically stable  T(C): 36.8 (23 @ 08:20), Max: 36.9 (23 @ 16:19)  HR: 88 (23 @ 08:20) (88 - 100)  BP: 102/62 (23 @ 08:20) (101/66 - 119/72)  RR: 18 (23 @ 08:20) (18 - 18)  SpO2: 98% (23 @ 08:20) (98% - 100%)  Wt(kg): --  HEENT:  Head is normocephalic    Skin:  Warm and dry without any rash   NECK:  Supple without lymphadenopathy.   HEART:  Regular rate and rhythm. normal S1 and S2, No M/R/G  LUNGS:  Good ins/exp effort, no W/R/R/C  ABDOMEN:  Soft, nontender, nondistended with good bowel sounds heard  EXTREMITIES:  Without cyanosis, clubbing or edema.   NEUROLOGICAL:  Gross nonfocal       CBC Full  -  ( 2023 07:25 )  WBC Count : 2.06 K/uL  RBC Count : 2.53 M/uL  Hemoglobin : 7.4 g/dL  Hematocrit : 21.8 %  Platelet Count - Automated : 52 K/uL  Mean Cell Volume : 86.2 fl  Mean Cell Hemoglobin : 29.2 pg  Mean Cell Hemoglobin Concentration : 33.9 gm/dL  Auto Neutrophil # : 1.45 K/uL  Auto Lymphocyte # : 0.32 K/uL  Auto Monocyte # : 0.14 K/uL  Auto Eosinophil # : 0.13 K/uL  Auto Basophil # : 0.00 K/uL  Auto Neutrophil % : 70.4 %  Auto Lymphocyte % : 15.5 %  Auto Monocyte % : 6.8 %  Auto Eosinophil % : 6.3 %  Auto Basophil % : 0.0 %    PT/INR - ( 15 Se 2023 17:56 )   PT: 15.8 sec;   INR: 1.36 ratio         PTT - ( 15 Se 2023 17:56 )  PTT:29.7 sec  Urinalysis Basic - ( 15 Se 2023 21:57 )    Color: Yellow / Appearance: Clear / S.015 / pH: x  Gluc: x / Ketone: Negative  / Bili: Negative / Urobili: 1   Blood: x / Protein: Negative / Nitrite: Negative   Leuk Esterase: Negative / RBC: x / WBC x   Sq Epi: x / Non Sq Epi: x / Bacteria: x          143  |  116<H>  |  14  ----------------------------<  87  3.6   |  20<L>  |  0.89    Ca    7.2<L>      2023 07:25    TPro  5.5<L>  /  Alb  1.7<L>  /  TBili  0.8  /  DBili  x   /  AST  28  /  ALT  113<H>  /  AlkPhos  203<H>  -    62 y/o M presented with weakness     # Weakness secondary to normocytic anemia in the setting of recent chemotherapy  - responded well to transfusion now in 7s  - s/p 2 units of PRBCs, will repeat H+H this afternoon   - Heme/Onc consult - Dr. Fonseca     # Febrile syndrome improving. Neutropenia. Multiple myeloma. CRF stage 3. Immunocompromised host.   - leukopenia   - C x 2, urine c/s noted  - on cefepime 2 gm IV q12h # 2  - tolerating abx well so far; no side effects noted  - continue abx coverage   - neutropenic precautions  - monitor temps    # Pancytopenia secondary to bone marrow suppression from chemotherapy   - WBC 1.87, ANC 1533 (no neutropenia), Hb 5.5 (baseline ~13), PLT 57, trend   - May need neupogen   - c/w Acyclovir for prophylaxis for opportunistic infections     # Acute kidney injury   - Cr 1.61 (baseline ~0.7), monitor closely   - s/p 2L of NS and 2 units of PRBCs, monitor off additional IVF and encouraged PO hydration  - Avoid nephrotoxic medications   - Strict I+Os     # Transaminitis, elevated alkaline phosphatase   - Alkaline phosphatase 291, AST 52, , trend   - Daughter states LFTs have been improving   - Abdominal exam WNL on exam   - If uptrending/acute abd pain/or worsening abd exam, will order CT abd/pelvis   - Ordered lipid panel and hepatitis panel     # Hypoalbuminemia   - Albumin 2.0   - Nutrition consult     # History of multiple myeloma, CVA (2022)  - c/w home medications; verified with pt at the bedside   - c/w ASA for now, occult negative     # DVT ppx: SCDs, encouraged ambulation   Code status: Full code   Emergency contact: Pat Morrow (daughter) [phone]

## 2023-06-17 NOTE — PROGRESS NOTE ADULT - SUBJECTIVE AND OBJECTIVE BOX
Date of service: 23 @ 13:19    Lying in bed in NAD  No further fever  s/p PRBC's  Weak looking    ROS: no fever or chills; denies dizziness, no HA, no SOB or cough, no abdominal pain, no diarrhea or constipation; no dysuria, no legs pain, no rashes    MEDICATIONS  (STANDING):  acyclovir   Oral Tab/Cap 400 milliGRAM(s) Oral two times a day  aspirin  chewable 81 milliGRAM(s) Oral daily  cefepime   IVPB 2000 milliGRAM(s) IV Intermittent every 12 hours  dexAMETHasone     Tablet 4 milliGRAM(s) Oral daily  hydrOXYzine hydrochloride Syrup 10 milliGRAM(s) Oral daily  lidocaine   4% Patch 1 Patch Transdermal daily  naloxone Injectable 0.4 milliGRAM(s) IV Push once  pantoprazole    Tablet 40 milliGRAM(s) Oral before breakfast  polyethylene glycol 3350 17 Gram(s) Oral daily  senna 2 Tablet(s) Oral at bedtime  sertraline 100 milliGRAM(s) Oral daily  vancomycin  IVPB 1000 milliGRAM(s) IV Intermittent every 24 hours    Vital Signs Last 24 Hrs  T(C): 36.8 (2023 08:20), Max: 36.9 (2023 16:19)  T(F): 98.2 (2023 08:20), Max: 98.4 (2023 16:19)  HR: 88 (2023 08:20) (88 - 100)  BP: 102/62 (2023 08:20) (101/66 - 119/72)  BP(mean): --  RR: 18 (2023 08:20) (18 - 18)  SpO2: 98% (2023 08:20) (98% - 100%)    Parameters below as of 2023 08:20  Patient On (Oxygen Delivery Method): room air         Physical exam:    Constitutional:  No acute distress  HEENT: NC/AT, EOMI, PERRLA, conjunctivae clear; ears and nose atraumatic; pharynx benign  Neck: supple; thyroid not palpable  Back: no tenderness  Respiratory: respiratory effort normal; clear to auscultation  Cardiovascular: S1S2 regular, no murmurs  Abdomen: soft, not tender, not distended, positive BS; no liver or spleen organomegaly  Genitourinary: no suprapubic tenderness  Lymphatic: no LN palpable  Musculoskeletal: no muscle tenderness, no joint swelling or tenderness  Extremities: no pedal edema  Neurological/ Psychiatric: AxOx3, judgement and insight normal; moving all extremities  Skin: no rashes; no palpable lesions    Labs: all available labs reviewed                        5.5    1.87  )-----------( 57       ( 15 Se 2023 17:56 )             17.1     06-15    135  |  107  |  30<H>  ----------------------------<  124<H>  4.5   |  18<L>  |  1.61<H>    Ca    8.4<L>      15 Se 2023 17:56    TPro  6.8  /  Alb  2.0<L>  /  TBili  1.2  /  DBili  x   /  AST  52<H>  /  ALT  217<H>  /  AlkPhos  291<H>  06-15     LIVER FUNCTIONS - ( 15 Se 2023 17:56 )  Alb: 2.0 g/dL / Pro: 6.8 gm/dL / ALK PHOS: 291 U/L / ALT: 217 U/L / AST: 52 U/L / GGT: x           Urinalysis Basic - ( 15 Se 2023 21:57 )    Color: Yellow / Appearance: Clear / S.015 / pH: x  Gluc: x / Ketone: Negative  / Bili: Negative / Urobili: 1   Blood: x / Protein: Negative / Nitrite: Negative   Leuk Esterase: Negative / RBC: x / WBC x   Sq Epi: x / Non Sq Epi: x / Bacteria: x    (06-15 @ 21:57)  [facility]    Radiology: all available radiological tests reviewed        Advanced directives addressed: full resuscitation

## 2023-06-18 VITALS
RESPIRATION RATE: 18 BRPM | SYSTOLIC BLOOD PRESSURE: 110 MMHG | TEMPERATURE: 98 F | HEART RATE: 89 BPM | DIASTOLIC BLOOD PRESSURE: 70 MMHG | OXYGEN SATURATION: 96 %

## 2023-06-18 LAB
ALBUMIN SERPL ELPH-MCNC: 1.8 G/DL — LOW (ref 3.3–5)
ALLERGY+IMMUNOLOGY DIAG STUDY NOTE: SIGNIFICANT CHANGE UP
ALP SERPL-CCNC: 196 U/L — HIGH (ref 40–120)
ALT FLD-CCNC: 107 U/L — HIGH (ref 12–78)
ANION GAP SERPL CALC-SCNC: 6 MMOL/L — SIGNIFICANT CHANGE UP (ref 5–17)
AST SERPL-CCNC: 38 U/L — HIGH (ref 15–37)
BILIRUB SERPL-MCNC: 0.6 MG/DL — SIGNIFICANT CHANGE UP (ref 0.2–1.2)
BUN SERPL-MCNC: 15 MG/DL — SIGNIFICANT CHANGE UP (ref 7–23)
CALCIUM SERPL-MCNC: 7.4 MG/DL — LOW (ref 8.5–10.1)
CHLORIDE SERPL-SCNC: 113 MMOL/L — HIGH (ref 96–108)
CO2 SERPL-SCNC: 23 MMOL/L — SIGNIFICANT CHANGE UP (ref 22–31)
CREAT SERPL-MCNC: 0.86 MG/DL — SIGNIFICANT CHANGE UP (ref 0.5–1.3)
EGFR: 97 ML/MIN/1.73M2 — SIGNIFICANT CHANGE UP
GLUCOSE SERPL-MCNC: 91 MG/DL — SIGNIFICANT CHANGE UP (ref 70–99)
HCT VFR BLD CALC: 24.3 % — LOW (ref 39–50)
HGB BLD-MCNC: 8.2 G/DL — LOW (ref 13–17)
MCHC RBC-ENTMCNC: 29.4 PG — SIGNIFICANT CHANGE UP (ref 27–34)
MCHC RBC-ENTMCNC: 33.7 GM/DL — SIGNIFICANT CHANGE UP (ref 32–36)
MCV RBC AUTO: 87.1 FL — SIGNIFICANT CHANGE UP (ref 80–100)
PLATELET # BLD AUTO: 80 K/UL — LOW (ref 150–400)
POTASSIUM SERPL-MCNC: 4 MMOL/L — SIGNIFICANT CHANGE UP (ref 3.5–5.3)
POTASSIUM SERPL-SCNC: 4 MMOL/L — SIGNIFICANT CHANGE UP (ref 3.5–5.3)
PROT SERPL-MCNC: 5.8 GM/DL — LOW (ref 6–8.3)
RBC # BLD: 2.79 M/UL — LOW (ref 4.2–5.8)
RBC # FLD: 16.1 % — HIGH (ref 10.3–14.5)
SODIUM SERPL-SCNC: 142 MMOL/L — SIGNIFICANT CHANGE UP (ref 135–145)
WBC # BLD: 2.13 K/UL — LOW (ref 3.8–10.5)
WBC # FLD AUTO: 2.13 K/UL — LOW (ref 3.8–10.5)

## 2023-06-18 PROCEDURE — 99239 HOSP IP/OBS DSCHRG MGMT >30: CPT

## 2023-06-18 RX ADMIN — Medication 4 MILLIGRAM(S): at 10:23

## 2023-06-18 RX ADMIN — Medication 10 MILLIGRAM(S): at 10:23

## 2023-06-18 RX ADMIN — POLYETHYLENE GLYCOL 3350 17 GRAM(S): 17 POWDER, FOR SOLUTION ORAL at 10:22

## 2023-06-18 RX ADMIN — Medication 81 MILLIGRAM(S): at 10:21

## 2023-06-18 RX ADMIN — Medication 10 MILLIGRAM(S): at 10:22

## 2023-06-18 RX ADMIN — Medication 400 MILLIGRAM(S): at 10:22

## 2023-06-18 RX ADMIN — SERTRALINE 100 MILLIGRAM(S): 25 TABLET, FILM COATED ORAL at 10:22

## 2023-06-18 RX ADMIN — PANTOPRAZOLE SODIUM 40 MILLIGRAM(S): 20 TABLET, DELAYED RELEASE ORAL at 07:06

## 2023-06-18 RX ADMIN — Medication 10 MILLIGRAM(S): at 01:52

## 2023-06-18 NOTE — PROGRESS NOTE ADULT - SUBJECTIVE AND OBJECTIVE BOX
Date of service: 23 @ 09:29    Lying in bed in NAD  No fever  Feels stronger  s/p PRBC's    ROS: no fever or chills; denies dizziness, no HA, no SOB or cough, no abdominal pain, no diarrhea or constipation; no dysuria, no legs pain, no rashes    MEDICATIONS  (STANDING):  acyclovir   Oral Tab/Cap 400 milliGRAM(s) Oral two times a day  aspirin  chewable 81 milliGRAM(s) Oral daily  cefepime   IVPB 2000 milliGRAM(s) IV Intermittent every 12 hours  dexAMETHasone     Tablet 4 milliGRAM(s) Oral daily  hydrOXYzine hydrochloride Syrup 10 milliGRAM(s) Oral daily  lidocaine   4% Patch 1 Patch Transdermal daily  naloxone Injectable 0.4 milliGRAM(s) IV Push once  pantoprazole    Tablet 40 milliGRAM(s) Oral before breakfast  polyethylene glycol 3350 17 Gram(s) Oral daily  senna 2 Tablet(s) Oral at bedtime  sertraline 100 milliGRAM(s) Oral daily  vancomycin  IVPB 1000 milliGRAM(s) IV Intermittent every 24 hours    Vital Signs Last 24 Hrs  T(C): 36.4 (2023 08:20), Max: 36.8 (2023 15:37)  T(F): 97.5 (2023 08:20), Max: 98.2 (2023 15:37)  HR: 89 (2023 08:20) (89 - 93)  BP: 110/70 (2023 08:20) (109/67 - 112/71)  BP(mean): --  RR: 18 (2023 08:20) (18 - 18)  SpO2: 96% (2023 08:20) (96% - 99%)    Parameters below as of 2023 08:20  Patient On (Oxygen Delivery Method): room air     Physical exam:    Constitutional:  No acute distress  HEENT: NC/AT, EOMI, PERRLA, conjunctivae clear; ears and nose atraumatic  Neck: supple; thyroid not palpable  Back: no tenderness  Respiratory: respiratory effort normal; clear to auscultation  Cardiovascular: S1S2 regular, no murmurs  Abdomen: soft, not tender, not distended, positive BS  Genitourinary: no suprapubic tenderness  Lymphatic: no LN palpable  Musculoskeletal: no muscle tenderness, no joint swelling or tenderness  Extremities: no pedal edema  Neurological/ Psychiatric: AxOx3, moving all extremities  Skin: no rashes; no palpable lesions    Labs: reviewed                        8.2    2.13  )-----------( 80       ( 2023 07:16 )             24.3         142  |  113<H>  |  15  ----------------------------<  91  4.0   |  23  |  0.86    Ca    7.4<L>      2023 07:16    TPro  5.8<L>  /  Alb  1.8<L>  /  TBili  0.6  /  DBili  x   /  AST  38<H>  /  ALT  107<H>  /  AlkPhos  196<H>                          5.5    1.87  )-----------( 57       ( 15 Se 2023 17:56 )             17.1     06-15    135  |  107  |  30<H>  ----------------------------<  124<H>  4.5   |  18<L>  |  1.61<H>    Ca    8.4<L>      15 Se 2023 17:56    TPro  6.8  /  Alb  2.0<L>  /  TBili  1.2  /  DBili  x   /  AST  52<H>  /  ALT  217<H>  /  AlkPhos  291<H>  06-15     LIVER FUNCTIONS - ( 15 Se 2023 17:56 )  Alb: 2.0 g/dL / Pro: 6.8 gm/dL / ALK PHOS: 291 U/L / ALT: 217 U/L / AST: 52 U/L / GGT: x           Urinalysis Basic - ( 15 Se 2023 21:57 )    Color: Yellow / Appearance: Clear / S.015 / pH: x  Gluc: x / Ketone: Negative  / Bili: Negative / Urobili: 1   Blood: x / Protein: Negative / Nitrite: Negative   Leuk Esterase: Negative / RBC: x / WBC x   Sq Epi: x / Non Sq Epi: x / Bacteria: x    (06-15 @ 21:57)  [facility]    Radiology: all available radiological tests reviewed        Advanced directives addressed: full resuscitation

## 2023-06-18 NOTE — DISCHARGE NOTE NURSING/CASE MANAGEMENT/SOCIAL WORK - NSDCPEFALRISK_GEN_ALL_CORE
For information on Fall & Injury Prevention, visit: [email or web address] OR  [email or web address] OR  https://www.cdc.gov/steadi/patient.html

## 2023-06-18 NOTE — DISCHARGE NOTE PROVIDER - NSDCMRMEDTOKEN_GEN_ALL_CORE_FT
aspirin 81 mg oral capsule: 1 tab(s) orally once a day  Atarax 10 mg oral tablet: 1 tab(s) orally once a day (at bedtime)  baclofen 10 mg oral tablet: 1 tab(s) orally every 8 hours as needed for hiccups  dexAMETHasone 4 mg oral tablet: 1 tab(s) orally once a day  docusate sodium 100 mg oral tablet: 1 tab(s) orally once a day  omeprazole 20 mg oral delayed release tablet: 1 tab(s) orally once a day  oxyCODONE 5 mg oral tablet: 1 tab(s) orally every 6 hours as needed for  moderate pain  Revlimid 25 mg oral capsule: 1 tab(s) orally once a day  senna (sennosides) 8.6 mg oral tablet: 2 tab(s) orally once a day (at bedtime)  Tylenol 325 mg oral capsule: 2 tab(s) orally every 6 hours as needed for pain, temp &gt; 100.4F  Zofran 8 mg oral tablet: 1 tab(s) orally every 8 hours as needed for  nausea  Zoloft 100 mg oral tablet: 1 tab(s) orally once a day  Zovirax 400 mg oral tablet: 1 tab(s) orally 2 times a day

## 2023-06-18 NOTE — DISCHARGE NOTE NURSING/CASE MANAGEMENT/SOCIAL WORK - PATIENT PORTAL LINK FT
You can access the FollowMyHealth Patient Portal offered by [facility] by registering at the following website: [email or web address]. By joining [facility]’s FollowMyHealth portal, you will also be able to view your health information using other applications (apps) compatible with our system.

## 2023-06-18 NOTE — DISCHARGE NOTE PROVIDER - NSDCCPCAREPLAN_GEN_ALL_CORE_FT
PRINCIPAL DISCHARGE DIAGNOSIS  Diagnosis: Pancytopenia  Assessment and Plan of Treatment: - secondary to chemo      SECONDARY DISCHARGE DIAGNOSES  Diagnosis: Fever  Assessment and Plan of Treatment: resolved    Diagnosis: Multiple myeloma  Assessment and Plan of Treatment:

## 2023-06-18 NOTE — DISCHARGE NOTE PROVIDER - CARE PROVIDER_API CALL
Cirilo Fonseca)  Internal Medicine  [address] [place] [address]  Phone: [phone]  Fax: [phone]  Follow Up Time:

## 2023-06-18 NOTE — PROGRESS NOTE ADULT - ASSESSMENT
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 Pt states that [place] [facility] [place] and he was being observed afterwards. He became "wobbly" on his feet and fell down. He does not have any injuries, did not lose consciousness and did not hit his head. His blood was taken and his Hb was found to be 6 so he was transferred to the hospital for evaluation. States that once he got to the hospital he had fever and chills. He had abdominal pain, nausea, and vomiting last night which have since improved. His 2nd round of chemotherapy was on the day PTA. Denies chest pain, SOB, and diarrhea. In ER he was noted with low grade fever ro 100.3F and received cefepime and vancomycin IV.     1. Febrile syndrome improving. Neutropenia. Multiple myeloma. CRF stage 3. Immunocompromised host.   -leukopenia   -BC x 2, urine c/s noted  -on cefepime 2 gm IV q12h # 3  -tolerating abx well so far; no side effects noted  -f/u cultures  -complete abx therapy today  -neutropenic precautions  -monitor temps  -f/u CBC  -supportive care  2. Other issues:   -care per medicine    
62 y/o Male with h/o multiple myeloma on chemotherapy, old CVA (8/2022) was admitted on 6/16 for increased weakness and unsteadiness on his feet. Pt states that he had chemotherapy at [facility] in [place] and he was being observed afterwards. He became "wobbly" on his feet and fell down. He does not have any injuries, did not lose consciousness and did not hit his head. His blood was taken and his Hb was found to be 6 so he was transferred to the hospital for evaluation. States that once he got to the hospital he had fever and chills. He had abdominal pain, nausea, and vomiting last night which have since improved. His 2nd round of chemotherapy was on the day PTA. Denies chest pain, SOB, and diarrhea. In ER he was noted with low grade fever ro 100.3F and received cefepime and vancomycin IV.     1. Febrile syndrome improving. Neutropenia. Multiple myeloma. CRF stage 3. Immunocompromised host.   -leukopenia   -BC x 2, urine c/s noted  -on cefepime 2 gm IV q12h # 2  -tolerating abx well so far; no side effects noted  -f/u cultures  -continue abx coverage   -neutropenic precautions  -monitor temps  -f/u CBC  -supportive care  2. Other issues:   -care per medicine

## 2023-06-18 NOTE — DISCHARGE NOTE PROVIDER - DETAILS OF MALNUTRITION DIAGNOSIS/DIAGNOSES
This patient has been assessed with a concern for Malnutrition and was treated during this hospitalization for the following Nutrition diagnosis/diagnoses:     -  06/16/2023: Severe protein-calorie malnutrition   -  06/16/2023: Underweight (BMI < 19)

## 2023-06-18 NOTE — DISCHARGE NOTE PROVIDER - HOSPITAL COURSE
Patient is a 62 y/o M with PMH multiple myeloma, CVA (8/2022) presented with weakness and unsteadiness on his feet. Pt states that [place] [facility] [place] and he was being observed afterwards. He became "wobbly" on his feet and fell down. He does not have any injuries, did not lose consciousness and did not hit his head. His blood was taken and his Hb was found to be 6 so he was transferred to the hospital for evaluation. States that once he got to the hospital he had fever and chills. He had abdominal pain, nausea, and vomiting last night which have since improved. No bowel movement since Tuesday. I spoke to his daughter who stated that the pt's LFTs have been elevated but have been decreasing. His 2nd round of chemotherapy was yesterday. Denies chest pain, SOB, and diarrhea.     Medical progress: Denies any HA, CP, SOB. No fevers, chills or shakes. Labs and vitals reviewed.   Complaints: no new complaints  State of mind: normal     Physical Exam:   GENERAL APPEARANCE:  NAD, hemodynamically stable  T(C): 36.4 (06-18-23 @ 08:20), Max: 36.8 (06-17-23 @ 15:37)  HR: 89 (06-18-23 @ 08:20) (89 - 93)  BP: 110/70 (06-18-23 @ 08:20) (109/67 - 112/71)  RR: 18 (06-18-23 @ 08:20) (18 - 18)  SpO2: 96% (06-18-23 @ 08:20) (96% - 99%)  HEENT:  Head is normocephalic    Skin:  Warm and dry without any rash   NECK:  Supple without lymphadenopathy.   HEART:  Regular rate and rhythm. normal S1 and S2, No M/R/G  LUNGS:  Good ins/exp effort, no W/R/R/C  ABDOMEN:  Soft, nontender, nondistended with good bowel sounds heard  EXTREMITIES:  Without cyanosis, clubbing or edema.   NEUROLOGICAL:  Gross nonfocal

## 2023-06-21 LAB
CULTURE RESULTS: SIGNIFICANT CHANGE UP
CULTURE RESULTS: SIGNIFICANT CHANGE UP
SPECIMEN SOURCE: SIGNIFICANT CHANGE UP
SPECIMEN SOURCE: SIGNIFICANT CHANGE UP

## 2023-06-22 LAB — ALLERGY+IMMUNOLOGY DIAG STUDY NOTE: SIGNIFICANT CHANGE UP

## 2023-06-27 DIAGNOSIS — N18.30 CHRONIC KIDNEY DISEASE, STAGE 3 UNSPECIFIED: ICD-10-CM

## 2023-06-27 DIAGNOSIS — Z79.82 LONG TERM (CURRENT) USE OF ASPIRIN: ICD-10-CM

## 2023-06-27 DIAGNOSIS — D61.810 ANTINEOPLASTIC CHEMOTHERAPY INDUCED PANCYTOPENIA: ICD-10-CM

## 2023-06-27 DIAGNOSIS — D70.9 NEUTROPENIA, UNSPECIFIED: ICD-10-CM

## 2023-06-27 DIAGNOSIS — C90.00 MULTIPLE MYELOMA NOT HAVING ACHIEVED REMISSION: ICD-10-CM

## 2023-06-27 DIAGNOSIS — R50.9 FEVER, UNSPECIFIED: ICD-10-CM

## 2023-06-27 DIAGNOSIS — Z86.73 PERSONAL HISTORY OF TRANSIENT ISCHEMIC ATTACK (TIA), AND CEREBRAL INFARCTION WITHOUT RESIDUAL DEFICITS: ICD-10-CM

## 2023-06-27 DIAGNOSIS — E43 UNSPECIFIED SEVERE PROTEIN-CALORIE MALNUTRITION: ICD-10-CM

## 2023-06-27 DIAGNOSIS — D64.81 ANEMIA DUE TO ANTINEOPLASTIC CHEMOTHERAPY: ICD-10-CM

## 2023-06-27 DIAGNOSIS — D84.9 IMMUNODEFICIENCY, UNSPECIFIED: ICD-10-CM

## 2023-06-27 DIAGNOSIS — E88.09 OTHER DISORDERS OF PLASMA-PROTEIN METABOLISM, NOT ELSEWHERE CLASSIFIED: ICD-10-CM

## 2023-06-27 DIAGNOSIS — Z96.641 PRESENCE OF RIGHT ARTIFICIAL HIP JOINT: ICD-10-CM

## 2023-06-27 DIAGNOSIS — N17.9 ACUTE KIDNEY FAILURE, UNSPECIFIED: ICD-10-CM

## 2023-06-27 DIAGNOSIS — Z87.891 PERSONAL HISTORY OF NICOTINE DEPENDENCE: ICD-10-CM

## 2023-06-27 DIAGNOSIS — E78.5 HYPERLIPIDEMIA, UNSPECIFIED: ICD-10-CM

## 2023-11-22 NOTE — DIETITIAN INITIAL EVALUATION ADULT - ADD RECOMMEND
yes
1) Continue w/ regular diet as tolerated  2) Add LPS TID (Provides 100kcal, 15g protein)  3) Monitor bowel movements, if no BM for >3 days, consider implementing bowel regimen.  4) Encourage protein-rich foods, maximize food preferences  5) MVI w/ minerals daily to ensure 100% RDA met  6) Consider adding thiamine 100 mg daily 2/2 poor PO intake/ malnutrition  7) Meal encouragement; tray set-up to increase po intake  8) Monitor lytes/ min and replete prn.  9) Confirm goals of care regarding nutrition support  RD will continue to monitor PO intake, labs, hydration, and wt prn.

## 2025-06-05 NOTE — PATIENT PROFILE ADULT - VISION (WITH CORRECTIVE LENSES IF THE PATIENT USUALLY WEARS THEM):
Stable. Ready to decrease xanax use with goal to transistion to klonopin daily. Will decrease dose monlty until 30 days supply then will start klnopin 0.5mg qd.           Partially impaired: cannot see medication labels or newsprint, but can see obstacles in path, and the surrounding layout; can count fingers at arm's length